# Patient Record
Sex: FEMALE | Race: WHITE | NOT HISPANIC OR LATINO | Employment: PART TIME | URBAN - METROPOLITAN AREA
[De-identification: names, ages, dates, MRNs, and addresses within clinical notes are randomized per-mention and may not be internally consistent; named-entity substitution may affect disease eponyms.]

---

## 2021-08-03 ENCOUNTER — TELEPHONE (OUTPATIENT)
Dept: OBGYN CLINIC | Facility: CLINIC | Age: 41
End: 2021-08-03

## 2021-08-03 DIAGNOSIS — Z30.41 SURVEILLANCE OF CONTRACEPTIVE PILL: Primary | ICD-10-CM

## 2021-08-03 RX ORDER — LEVONORGESTREL AND ETHINYL ESTRADIOL 0.15-0.03
1 KIT ORAL DAILY
Qty: 28 TABLET | Refills: 1 | Status: SHIPPED | OUTPATIENT
Start: 2021-08-03 | End: 2021-10-28 | Stop reason: SDUPTHER

## 2021-08-03 NOTE — TELEPHONE ENCOUNTER
[facility] called requesting OCP refill to start new pack on Sunday  Prior  [facility] 6/2020 with jahaira Elias scheduled for 9/23/2021  OCP Jonas 1 15-0 03  Confirmed [facility] pharmacy on [facility]  [place]  Does not need call back unless unable to provide refill  Dr Miriam Henderson, please address refill until [facility]

## 2021-09-21 ENCOUNTER — VBI (OUTPATIENT)
Dept: ADMINISTRATIVE | Facility: OTHER | Age: 41
End: 2021-09-21

## 2021-09-21 NOTE — TELEPHONE ENCOUNTER
Upon review of the In Basket request we were able to locate, review, and update the patient chart as requested for Pap Smear (HPV) aka Cervical Cancer Screening  Any additional questions or concerns should be emailed to the Practice Liaisons via [email or web address] email, please do not reply via In Basket      Thank you  Edie Cowan

## 2021-10-27 RX ORDER — VILAZODONE HYDROCHLORIDE 40 MG/1
40 TABLET ORAL
COMMUNITY

## 2021-10-28 ENCOUNTER — ANNUAL EXAM (OUTPATIENT)
Dept: OBGYN CLINIC | Facility: CLINIC | Age: 41
End: 2021-10-28
Payer: COMMERCIAL

## 2021-10-28 VITALS
DIASTOLIC BLOOD PRESSURE: 88 MMHG | BODY MASS INDEX: 33.34 KG/M2 | HEIGHT: 67 IN | SYSTOLIC BLOOD PRESSURE: 140 MMHG | WEIGHT: 212.4 LBS

## 2021-10-28 DIAGNOSIS — Z80.3 FAMILY HISTORY OF BREAST CANCER: ICD-10-CM

## 2021-10-28 DIAGNOSIS — Z12.31 ENCOUNTER FOR SCREENING MAMMOGRAM FOR MALIGNANT NEOPLASM OF BREAST: ICD-10-CM

## 2021-10-28 DIAGNOSIS — Z30.41 SURVEILLANCE OF CONTRACEPTIVE PILL: ICD-10-CM

## 2021-10-28 DIAGNOSIS — Z01.419 ENCOUNTER FOR GYNECOLOGICAL EXAMINATION WITHOUT ABNORMAL FINDING: Primary | ICD-10-CM

## 2021-10-28 DIAGNOSIS — F32.A DEPRESSION, UNSPECIFIED DEPRESSION TYPE: ICD-10-CM

## 2021-10-28 DIAGNOSIS — Z30.09 CONTRACEPTIVE EDUCATION: ICD-10-CM

## 2021-10-28 PROCEDURE — 99396 PREV VISIT EST AGE 40-64: CPT | Performed by: OBSTETRICS & GYNECOLOGY

## 2021-10-28 RX ORDER — LATANOPROST 50 UG/ML
SOLUTION/ DROPS OPHTHALMIC
COMMUNITY
Start: 2021-10-12

## 2021-10-28 RX ORDER — LEVONORGESTREL AND ETHINYL ESTRADIOL 0.15-0.03
1 KIT ORAL DAILY
Qty: 90 TABLET | Refills: 3 | Status: SHIPPED | OUTPATIENT
Start: 2021-10-28 | End: 2022-03-25 | Stop reason: SDUPTHER

## 2021-10-28 RX ORDER — PROPRANOLOL HYDROCHLORIDE 20 MG/1
TABLET ORAL
COMMUNITY
Start: 2021-10-01

## 2021-11-11 ENCOUNTER — OFFICE VISIT (OUTPATIENT)
Dept: GASTROENTEROLOGY | Facility: CLINIC | Age: 41
End: 2021-11-11
Payer: COMMERCIAL

## 2021-11-11 VITALS
HEART RATE: 87 BPM | SYSTOLIC BLOOD PRESSURE: 140 MMHG | WEIGHT: 214 LBS | HEIGHT: 67 IN | DIASTOLIC BLOOD PRESSURE: 84 MMHG | BODY MASS INDEX: 33.59 KG/M2

## 2021-11-11 DIAGNOSIS — K21.9 GASTROESOPHAGEAL REFLUX DISEASE WITHOUT ESOPHAGITIS: Primary | ICD-10-CM

## 2021-11-11 DIAGNOSIS — Z80.0 FAMILY HISTORY OF COLON CANCER: Primary | ICD-10-CM

## 2021-11-11 DIAGNOSIS — Z80.0 FAMILY HISTORY OF COLON CANCER: ICD-10-CM

## 2021-11-11 DIAGNOSIS — Z83.71 FAMILY HISTORY OF COLONIC POLYPS: ICD-10-CM

## 2021-11-11 PROCEDURE — 99214 OFFICE O/P EST MOD 30 MIN: CPT | Performed by: INTERNAL MEDICINE

## 2021-11-11 RX ORDER — SODIUM PICOSULFATE, MAGNESIUM OXIDE, AND ANHYDROUS CITRIC ACID 10; 3.5; 12 MG/160ML; G/160ML; G/160ML
LIQUID ORAL
Qty: 320 ML | Refills: 0 | Status: SHIPPED | OUTPATIENT
Start: 2021-11-11 | End: 2021-12-17 | Stop reason: HOSPADM

## 2021-11-11 RX ORDER — LEVONORGESTREL AND ETHINYL ESTRADIOL 0.15-0.03
1 KIT ORAL DAILY
COMMUNITY
End: 2022-04-21 | Stop reason: SDUPTHER

## 2021-11-11 RX ORDER — PANTOPRAZOLE SODIUM 40 MG/1
40 TABLET, DELAYED RELEASE ORAL DAILY
Qty: 30 TABLET | Refills: 2 | Status: SHIPPED | OUTPATIENT
Start: 2021-11-11 | End: 2022-03-13

## 2021-12-10 ENCOUNTER — TELEPHONE (OUTPATIENT)
Dept: GASTROENTEROLOGY | Facility: CLINIC | Age: 41
End: 2021-12-10

## 2021-12-17 ENCOUNTER — ANESTHESIA (OUTPATIENT)
Dept: GASTROENTEROLOGY | Facility: AMBULATORY SURGERY CENTER | Age: 41
End: 2021-12-17

## 2021-12-17 ENCOUNTER — ANESTHESIA EVENT (OUTPATIENT)
Dept: GASTROENTEROLOGY | Facility: AMBULATORY SURGERY CENTER | Age: 41
End: 2021-12-17

## 2021-12-17 ENCOUNTER — HOSPITAL ENCOUNTER (OUTPATIENT)
Dept: GASTROENTEROLOGY | Facility: AMBULATORY SURGERY CENTER | Age: 41
Discharge: HOME | End: 2021-12-17
Payer: COMMERCIAL

## 2021-12-17 VITALS
DIASTOLIC BLOOD PRESSURE: 71 MMHG | SYSTOLIC BLOOD PRESSURE: 112 MMHG | OXYGEN SATURATION: 96 % | RESPIRATION RATE: 24 BRPM | HEART RATE: 60 BPM | HEIGHT: 68 IN | TEMPERATURE: 98.6 F | BODY MASS INDEX: 28.04 KG/M2 | WEIGHT: 185 LBS

## 2021-12-17 DIAGNOSIS — Z83.71 FAMILY HISTORY OF COLONIC POLYPS: ICD-10-CM

## 2021-12-17 DIAGNOSIS — K21.9 GASTROESOPHAGEAL REFLUX DISEASE WITHOUT ESOPHAGITIS: ICD-10-CM

## 2021-12-17 DIAGNOSIS — Z80.0 FAMILY HISTORY OF COLON CANCER: ICD-10-CM

## 2021-12-17 PROBLEM — H40.9 GLAUCOMA: Status: ACTIVE | Noted: 2021-12-17

## 2021-12-17 LAB
EXT PREGNANCY TEST URINE: NEGATIVE
EXT. CONTROL: NORMAL

## 2021-12-17 PROCEDURE — 45380 COLONOSCOPY AND BIOPSY: CPT | Performed by: INTERNAL MEDICINE

## 2021-12-17 PROCEDURE — 43239 EGD BIOPSY SINGLE/MULTIPLE: CPT | Performed by: INTERNAL MEDICINE

## 2021-12-17 PROCEDURE — 88305 TISSUE EXAM BY PATHOLOGIST: CPT | Performed by: SPECIALIST

## 2021-12-17 RX ORDER — LIDOCAINE HYDROCHLORIDE 10 MG/ML
INJECTION, SOLUTION EPIDURAL; INFILTRATION; INTRACAUDAL; PERINEURAL AS NEEDED
Status: DISCONTINUED | OUTPATIENT
Start: 2021-12-17 | End: 2021-12-17

## 2021-12-17 RX ORDER — PROPOFOL 10 MG/ML
INJECTION, EMULSION INTRAVENOUS AS NEEDED
Status: DISCONTINUED | OUTPATIENT
Start: 2021-12-17 | End: 2021-12-17

## 2021-12-17 RX ORDER — SODIUM CHLORIDE, SODIUM LACTATE, POTASSIUM CHLORIDE, CALCIUM CHLORIDE 600; 310; 30; 20 MG/100ML; MG/100ML; MG/100ML; MG/100ML
50 INJECTION, SOLUTION INTRAVENOUS CONTINUOUS
Status: DISCONTINUED | OUTPATIENT
Start: 2021-12-17 | End: 2021-12-21 | Stop reason: HOSPADM

## 2021-12-17 RX ADMIN — PROPOFOL 150 MG: 10 INJECTION, EMULSION INTRAVENOUS at 13:42

## 2021-12-17 RX ADMIN — PROPOFOL 50 MG: 10 INJECTION, EMULSION INTRAVENOUS at 13:47

## 2021-12-17 RX ADMIN — PROPOFOL 50 MG: 10 INJECTION, EMULSION INTRAVENOUS at 13:44

## 2021-12-17 RX ADMIN — PROPOFOL 50 MG: 10 INJECTION, EMULSION INTRAVENOUS at 13:51

## 2021-12-17 RX ADMIN — SODIUM CHLORIDE, SODIUM LACTATE, POTASSIUM CHLORIDE, CALCIUM CHLORIDE 50 ML/HR: 600; 310; 30; 20 INJECTION, SOLUTION INTRAVENOUS at 13:23

## 2021-12-17 RX ADMIN — PROPOFOL 50 MG: 10 INJECTION, EMULSION INTRAVENOUS at 13:57

## 2021-12-17 RX ADMIN — PROPOFOL 50 MG: 10 INJECTION, EMULSION INTRAVENOUS at 14:09

## 2021-12-17 RX ADMIN — PROPOFOL 50 MG: 10 INJECTION, EMULSION INTRAVENOUS at 14:03

## 2021-12-17 RX ADMIN — LIDOCAINE HYDROCHLORIDE 50 MG: 10 INJECTION, SOLUTION EPIDURAL; INFILTRATION; INTRACAUDAL; PERINEURAL at 13:42

## 2022-03-10 ENCOUNTER — HOSPITAL ENCOUNTER (OUTPATIENT)
Dept: MAMMOGRAPHY | Facility: CLINIC | Age: 42
Discharge: HOME | End: 2022-03-10
Payer: COMMERCIAL

## 2022-03-10 VITALS — HEIGHT: 68 IN | BODY MASS INDEX: 28.04 KG/M2 | WEIGHT: 185 LBS

## 2022-03-10 DIAGNOSIS — Z80.3 FAMILY HISTORY OF BREAST CANCER: ICD-10-CM

## 2022-03-10 DIAGNOSIS — Z12.31 ENCOUNTER FOR SCREENING MAMMOGRAM FOR MALIGNANT NEOPLASM OF BREAST: ICD-10-CM

## 2022-03-10 PROCEDURE — 77067 SCR MAMMO BI INCL CAD: CPT

## 2022-03-10 PROCEDURE — 77063 BREAST TOMOSYNTHESIS BI: CPT

## 2022-03-13 DIAGNOSIS — K21.9 GASTROESOPHAGEAL REFLUX DISEASE WITHOUT ESOPHAGITIS: ICD-10-CM

## 2022-03-13 RX ORDER — PANTOPRAZOLE SODIUM 40 MG/1
TABLET, DELAYED RELEASE ORAL
Qty: 30 TABLET | Refills: 2 | Status: SHIPPED | OUTPATIENT
Start: 2022-03-13 | End: 2022-06-25

## 2022-03-25 ENCOUNTER — AMB VIDEO VISIT (OUTPATIENT)
Dept: OTHER | Facility: HOSPITAL | Age: 42
End: 2022-03-25

## 2022-03-25 DIAGNOSIS — J01.40 ACUTE NON-RECURRENT PANSINUSITIS: Primary | ICD-10-CM

## 2022-03-25 PROBLEM — F50.029 ANOREXIA NERVOSA, BINGE-EATING PURGING TYPE: Status: ACTIVE | Noted: 2022-03-25

## 2022-03-25 PROBLEM — F50.02 ANOREXIA NERVOSA, BINGE-EATING PURGING TYPE: Status: ACTIVE | Noted: 2022-03-25

## 2022-03-25 PROCEDURE — ECARE PR SL URGENT CARE VIRTUAL VISIT: Performed by: PHYSICIAN ASSISTANT

## 2022-03-25 RX ORDER — AMOXICILLIN AND CLAVULANATE POTASSIUM 875; 125 MG/1; MG/1
1 TABLET, FILM COATED ORAL 2 TIMES DAILY
Qty: 20 TABLET | Refills: 0 | Status: SHIPPED | OUTPATIENT
Start: 2022-03-25 | End: 2022-04-04

## 2022-03-25 NOTE — PATIENT INSTRUCTIONS
As discussed, sinus infections are typically viral and will get better on their own in 7-10 days  You should try symptomatic relief in the meantime with OTC allergy meds (Claritin or Zyrtec)  You can also try sinus rinses with a neti pot or nasal lavage (be sure to use distilled water ) If your symptoms do not improve after 7-10 days, you can take antibiotics because it is more likely to be bacterial at that point  Follow-up with your primary care physician for recheck in 2-3 business days  Go to the ER for sudden severe headache, high fevers, change in vision, seizure activity or anything else that is concerning

## 2022-03-25 NOTE — PROGRESS NOTES
Video Visit - [facility] Wilman 40 y o  female MRN: [id number]    REQUIRED DOCUMENTATION:         1  [facility]  2  Provider located at [address] [place] [phone]  3  [facility] provider: Pedrito Oviedo PA-C   4  Identify all parties in room with patient during [facility] visit:  No one else  5  After connecting through [facility]), patient was identified by name and date of birth  Patient was then informed that this was a Telemedicine visit and that the exam was being conducted confidentially over secure lines  My office door was closed  No one else was in the room  Patient acknowledged consent and understanding of privacy and security of the Telemedicine visit  I informed the patient that I have reviewed their record in Epic and presented the opportunity for them to ask any questions regarding the visit today  The patient agreed to participate  VITALS: Heart Rate: 78 BPM, Respiratory Rate: 16 RPM, Temperature 100 3° F, Blood Pressure Unavailable mmHg, Pulse Ox Unavailable % on RA    HPI  Pt reports facial pressure, sneezing, runny stuffy nose, cough, fevers, sore throat since Tuesday  Sx worsening  Taking theraflu with some relief of sx, but due to glaucoma, can't take a lot of meds  No known sick contacts, at home COVID tests negative x 2  Vaccinated for COVID x 3  Not a smoker    Physical Exam  Constitutional:       General: She is in acute distress (mild)  Appearance: Normal appearance  She is ill-appearing  She is not toxic-appearing  HENT:      Head: Normocephalic and atraumatic  Nose: Rhinorrhea (frequently sniffling and wiping nose) present  Comments: Sounds congested     Mouth/Throat:      Mouth: Mucous membranes are moist    Eyes:      Conjunctiva/sclera: Conjunctivae normal    Pulmonary:      Effort: Pulmonary effort is normal  No respiratory distress  Breath sounds:  No wheezing (no gross audible wheeze through computer)  Musculoskeletal:      Cervical back: Normal range of motion  Skin:     Findings: No rash (on face or neck)  Neurological:      Mental Status: She is alert  Cranial Nerves: No dysarthria or facial asymmetry  Psychiatric:         Mood and Affect: Mood normal          Behavior: Behavior normal          Diagnoses and all orders for this visit:    Acute non-recurrent pansinusitis  -     amoxicillin-clavulanate (AUGMENTIN) 875-125 mg per tablet; Take 1 tablet by mouth 2 (two) times a day for 10 days      Patient Instructions   As discussed, sinus infections are typically viral and will get better on their own in 7-10 days  You should try symptomatic relief in the meantime with OTC allergy meds (Claritin or Zyrtec)  You can also try sinus rinses with a neti pot or nasal lavage (be sure to use distilled water ) If your symptoms do not improve after 7-10 days, you can take antibiotics because it is more likely to be bacterial at that point  Follow-up with your primary care physician for recheck in 2-3 business days  Go to the ER for sudden severe headache, high fevers, change in vision, seizure activity or anything else that is concerning

## 2022-03-25 NOTE — CARE ANYWHERE EVISITS
Visit Summary for [facility] - Gender: Female - Date of Birth: [id number]  Date: [id number] - Duration: 9 minutes  Patient: [address]  Provider: Jc Carrillo PA-C    Patient Contact Information  Address  [address]; [place]  [phone]    Visit Topics    Triage Questions   What is your current physical address in the event of a medical emergency? Answer []  Are you allergic to any medications? Answer []  Are you now or could you be pregnant? Answer []  Do you have any immune system compromise or chronic lung   disease? Answer []  Do you have any vulnerable family members in the home (infant, pregnant, cancer, elderly)? Answer []     Conversation Transcripts  [0A][0A] [Notification] You are connected with Jc Carrillo PA-C, Urgent Care Specialist [0A][Notification] [facility] is located in [place]  [0A][Notification] [facility] has shared health history  Jose Enrique Pena  [0A]    Diagnosis  Acute pansinusitis    Procedures  Value: [id number] Code: CPT-4 UNLISTED E&M SERVICE    Medications Prescribed    No prescriptions ordered    Electronically signed by: Marycruz Sesay(NPI [id number])

## 2022-04-20 ENCOUNTER — TELEPHONE (OUTPATIENT)
Dept: OBGYN CLINIC | Facility: CLINIC | Age: 42
End: 2022-04-20

## 2022-04-20 DIAGNOSIS — Z30.41 SURVEILLANCE OF CONTRACEPTIVE PILL: Primary | ICD-10-CM

## 2022-04-20 NOTE — TELEPHONE ENCOUNTER
Pt called to inquire why her birth control (Seasonale) script had been canceled  Pt had a WA on 1028/22  Upon review of chart, pt had a Tele med visit with [facility] on 3/25/22, prescription was inadvertently cancelled at her Tele-med visit  Please resend a script for her birth control  Pt is aware she is due for [facility] after 10/28/22

## 2022-04-21 RX ORDER — LEVONORGESTREL AND ETHINYL ESTRADIOL 0.15-0.03
1 KIT ORAL DAILY
Qty: 90 TABLET | Refills: 0 | Status: SHIPPED | OUTPATIENT
Start: 2022-04-21 | End: 2022-07-18

## 2022-06-25 DIAGNOSIS — K21.9 GASTROESOPHAGEAL REFLUX DISEASE WITHOUT ESOPHAGITIS: ICD-10-CM

## 2022-06-25 RX ORDER — PANTOPRAZOLE SODIUM 40 MG/1
TABLET, DELAYED RELEASE ORAL
Qty: 30 TABLET | Refills: 2 | Status: SHIPPED | OUTPATIENT
Start: 2022-06-25

## 2022-09-27 DIAGNOSIS — K21.9 GASTROESOPHAGEAL REFLUX DISEASE WITHOUT ESOPHAGITIS: ICD-10-CM

## 2022-09-27 RX ORDER — PANTOPRAZOLE SODIUM 40 MG/1
TABLET, DELAYED RELEASE ORAL
Qty: 30 TABLET | Refills: 2 | Status: SHIPPED | OUTPATIENT
Start: 2022-09-27

## 2022-11-09 NOTE — PROGRESS NOTES
Assessment/Plan:    Contraceptive education  39 yo G1 on continuous OCPs and Lamictal who would like to d/c OCPs due to psychiatrist discussion that meds may be more effective off hormones and concern for decreased pill efficacy with Lamictal   R&B of vasectomy, tubal ligation and IUDs reviewed  Interested in progesterone IUD after thorough discussion and question review  Will verify coverage, continue OCPs (continous) until insertion and RTO for placement  Pre-insertion recommendations reviewed  Diagnoses and all orders for this visit:    Contraceptive education          Subjective:      Patient ID: Rose Mary Aleman is a 40 y o  female  HPI Last well check 11/10/22    The following portions of the patient's history were reviewed and updated as appropriate:   She  has a past medical history of Anxiety, Bulimia (2020), Depression, Eating disorder, GERD (gastroesophageal reflux disease), Glaucoma, Migraines, Papanicolaou smear for cervical cancer screening (2019), and Tremors of nervous system  She   Patient Active Problem List    Diagnosis Date Noted   • Contraceptive education 11/11/2022   • Gynecologic exam normal 11/10/2022   • Anorexia nervosa, binge-eating purging type 03/25/2022   • Gastroesophageal reflux disease 12/17/2021   • Glaucoma 12/17/2021     She  has a past surgical history that includes Tonsillectomy; Sinus surgery; Upper gastrointestinal endoscopy; Colonoscopy; and TONSILECTOMY AND ADNOIDECTOMY  Her family history includes Alzheimer's disease in her maternal grandmother; Breast cancer (age of onset: 52) in her maternal aunt; Colon cancer in her paternal grandmother; Colon polyps in her brother and father; Heart attack in her maternal grandfather; Hyperlipidemia in her mother; Hypertension in her father and maternal grandfather; Lymphoma in her father; Rectal cancer (age of onset: 43) in her brother; Skin cancer in her maternal grandmother; Stroke in her paternal aunt    She  reports that she has never smoked  She has never used smokeless tobacco  She reports previous alcohol use  She reports that she does not use drugs  Current Outpatient Medications   Medication Sig Dispense Refill   • fluconazole (DIFLUCAN) 150 mg tablet Take 1 tablet (150 mg total) by mouth every other day for 2 doses 2 tablet 0   • lamoTRIgine (LaMICtal) 200 MG tablet Take 400 mg by mouth daily     • latanoprost (XALATAN) 0 005 % ophthalmic solution place 1 drop into both eyes at bedtime     • levonorgestrel-ethinyl estradiol (Setlakin) 0 15-0 03 MG per tablet Take 1 tablet by mouth daily 90 tablet 3   • lithium carbonate 150 mg capsule Take 150 mg by mouth 2 (two) times a day     • pantoprazole (PROTONIX) 40 mg tablet take 1 tablet by mouth once daily 30 tablet 2   • propranolol (INDERAL) 20 mg tablet take 1 tablet by mouth once daily if needed for ACUTE ANXIETY     • vilazodone (VIIBRYD) 40 mg tablet Take 40 mg by mouth daily with breakfast       No current facility-administered medications for this visit  She is allergic to latex       Review of Systems  no menses    Objective:      /70 (BP Location: Left arm, Patient Position: Sitting, Cuff Size: Large)   Ht 5' 6 5" (1 689 m)   Wt 97 2 kg (214 lb 4 8 oz)   Breastfeeding No   BMI 34 07 kg/m²          Physical Exam    Appears well, no apparent distress  Does not appear anxious or depressed

## 2022-11-10 ENCOUNTER — ANNUAL EXAM (OUTPATIENT)
Dept: OBGYN CLINIC | Facility: CLINIC | Age: 42
End: 2022-11-10

## 2022-11-10 VITALS
BODY MASS INDEX: 33.62 KG/M2 | HEIGHT: 67 IN | WEIGHT: 214.2 LBS | DIASTOLIC BLOOD PRESSURE: 80 MMHG | SYSTOLIC BLOOD PRESSURE: 126 MMHG

## 2022-11-10 DIAGNOSIS — Z12.31 ENCOUNTER FOR SCREENING MAMMOGRAM FOR MALIGNANT NEOPLASM OF BREAST: ICD-10-CM

## 2022-11-10 DIAGNOSIS — B37.9 YEAST INFECTION: ICD-10-CM

## 2022-11-10 DIAGNOSIS — Z30.41 SURVEILLANCE OF CONTRACEPTIVE PILL: ICD-10-CM

## 2022-11-10 DIAGNOSIS — Z01.419 GYNECOLOGIC EXAM NORMAL: Primary | ICD-10-CM

## 2022-11-10 RX ORDER — LAMOTRIGINE 200 MG/1
400 TABLET ORAL DAILY
COMMUNITY
Start: 2022-09-15

## 2022-11-10 RX ORDER — LEVONORGESTREL AND ETHINYL ESTRADIOL 0.15-0.03
1 KIT ORAL DAILY
Qty: 90 TABLET | Refills: 3 | Status: SHIPPED | OUTPATIENT
Start: 2022-11-10 | End: 2023-05-09

## 2022-11-10 RX ORDER — LITHIUM CARBONATE 150 MG/1
150 CAPSULE ORAL 2 TIMES DAILY
COMMUNITY
Start: 2022-08-27

## 2022-11-10 RX ORDER — FLUCONAZOLE 150 MG/1
150 TABLET ORAL EVERY OTHER DAY
Qty: 2 TABLET | Refills: 0 | Status: SHIPPED | OUTPATIENT
Start: 2022-11-10 | End: 2022-11-13

## 2022-11-10 NOTE — ASSESSMENT & PLAN NOTE
Pap guidelines reviewed  Pap deferred secondary to negative pap in 2019 in this low risk patient  Continue yearly mammograms  Script given  Reviewed yeast infection on today's exam  Will plan to treat with Diflucan 150mg x 2 doses 2 days apart  For prevention: Recommend acidophilus or yogurt daily, avoid irritating soaps or lotions, no tight fitted pants or underwear, avoid bubble baths, do not stay in wet swimsuit    Has appt tomorrow with Dr Moreno Monsalve to review tubal ligation will refill birth control until has tubal

## 2022-11-10 NOTE — PROGRESS NOTES
Assessment/Plan   Problem List Items Addressed This Visit        Other    Gynecologic exam normal - Primary     Pap guidelines reviewed  Pap deferred secondary to negative pap in 2019 in this low risk patient  Continue yearly mammograms  Script given  Reviewed yeast infection on today's exam  Will plan to treat with Diflucan 150mg x 2 doses 2 days apart  For prevention: Recommend acidophilus or yogurt daily, avoid irritating soaps or lotions, no tight fitted pants or underwear, avoid bubble baths, do not stay in wet swimsuit  Has appt tomorrow with Dr Emma Carlos to review tubal ligation will refill birth control until has tubal               Other Visit Diagnoses     Encounter for screening mammogram for malignant neoplasm of breast        Relevant Orders    Mammo screening bilateral w 3d & cad    Surveillance of contraceptive pill        Relevant Medications    levonorgestrel-ethinyl estradiol (Setlakin) 0 15-0 03 MG per tablet    Yeast infection        Relevant Medications    fluconazole (DIFLUCAN) 150 mg tablet          Subjective:     Patient ID: Kath Alvarenga is a 40 y o  y o  female  HPI  39 yo seen for annual exam  Currently on Setlakin OCP, has consultation tomorrow with Dr Emma Carlos to discuss permanent sterilization as OCP is interacting with her medications (Lamictal) and she may not be getting full benefit  Denies bowel or bladder issues out of the ordinary  Reports vaginal discharge yellow-green in color and itching  Denies odor  Reports has issues with what she believes are recurrent BV infection  Has been using OTC "V Fresh" which sometimes helps  Last pap: 6/5/2019 NILM (-)HRHPV  Last mammogram: 3/10/2022 BIRADS 1: Negative  Last colonoscopy: 2021 recommend repeat in 2 years     The following portions of the patient's history were reviewed and updated as appropriate:   She  has a past medical history of Anxiety, Bulimia (2020), Depression, Eating disorder, GERD (gastroesophageal reflux disease), Glaucoma, Migraines, Papanicolaou smear for cervical cancer screening (2019), and Tremors of nervous system  She   Patient Active Problem List    Diagnosis Date Noted   • Gynecologic exam normal 11/10/2022   • Anorexia nervosa, binge-eating purging type 03/25/2022   • Gastroesophageal reflux disease 12/17/2021   • Glaucoma 12/17/2021     She  has a past surgical history that includes Tonsillectomy; Sinus surgery; Upper gastrointestinal endoscopy; Colonoscopy; and TONSILECTOMY AND ADNOIDECTOMY  Her family history includes Alzheimer's disease in her maternal grandmother; Breast cancer (age of onset: 52) in her maternal aunt; Colon cancer in her paternal grandmother; Colon polyps in her brother and father; Heart attack in her maternal grandfather; Hyperlipidemia in her mother; Hypertension in her father and maternal grandfather; Lymphoma in her father; Rectal cancer (age of onset: 43) in her brother; Skin cancer in her maternal grandmother; Stroke in her paternal aunt  She  reports that she has never smoked  She has never used smokeless tobacco  She reports previous alcohol use  She reports that she does not use drugs    Current Outpatient Medications   Medication Sig Dispense Refill   • fluconazole (DIFLUCAN) 150 mg tablet Take 1 tablet (150 mg total) by mouth every other day for 2 doses 2 tablet 0   • lamoTRIgine (LaMICtal) 200 MG tablet Take 400 mg by mouth daily     • latanoprost (XALATAN) 0 005 % ophthalmic solution place 1 drop into both eyes at bedtime     • levonorgestrel-ethinyl estradiol (Setlakin) 0 15-0 03 MG per tablet Take 1 tablet by mouth daily 90 tablet 3   • lithium carbonate 150 mg capsule Take 150 mg by mouth 2 (two) times a day     • pantoprazole (PROTONIX) 40 mg tablet take 1 tablet by mouth once daily 30 tablet 2   • propranolol (INDERAL) 20 mg tablet take 1 tablet by mouth once daily if needed for ACUTE ANXIETY     • vilazodone (VIIBRYD) 40 mg tablet Take 40 mg by mouth daily with breakfast       No current facility-administered medications for this visit  She is allergic to latex       Menstrual History:  OB History        0    Para   0    Term   0       0    AB   0    Living   0       SAB   0    IAB   0    Ectopic   0    Multiple   0    Live Births   0           Obstetric Comments   Menarche: 13              No LMP recorded  (Menstrual status: Birth Control)  Review of Systems   Constitutional: Negative for fatigue, fever and unexpected weight change  HENT: Negative for dental problem and sinus pressure  Eyes: Negative for visual disturbance  Respiratory: Negative for cough, shortness of breath and wheezing  Cardiovascular: Negative for chest pain  Gastrointestinal: Negative for abdominal pain, blood in stool, constipation, diarrhea, nausea and vomiting  Endocrine: Negative for polydipsia  Genitourinary: Negative for difficulty urinating, dyspareunia, dysuria, frequency, hematuria, pelvic pain and urgency  Musculoskeletal: Negative for arthralgias and back pain  Neurological: Negative for dizziness, seizures, light-headedness and headaches  Psychiatric/Behavioral: Negative for suicidal ideas  The patient is not nervous/anxious  Objective:  Vitals:    11/10/22 1308   BP: 126/80   BP Location: Left arm   Patient Position: Sitting   Cuff Size: Large   Weight: 97 2 kg (214 lb 3 2 oz)   Height: 5' 6 5" (1 689 m)      Physical Exam  Constitutional:       Appearance: Normal appearance  She is well-developed  Genitourinary:      Vulva and bladder normal       No lesions in the vagina  Right Labia: No rash, tenderness, lesions or skin changes  Left Labia: No tenderness, lesions, skin changes or rash  No labial fusion noted  No inguinal adenopathy present in the right or left side  Vaginal discharge (copious amount of thick yellow curd like discharge) present  No vaginal erythema, tenderness or bleeding          Right Adnexa: not tender, not full and no mass present  Left Adnexa: not tender, not full and no mass present  No cervical motion tenderness, discharge or lesion  Uterus is not enlarged, tender or irregular  No uterine mass detected  No urethral prolapse, tenderness or mass present  Bladder is not tender  Breasts: Breasts are symmetrical       Right: No swelling, bleeding, inverted nipple, mass, nipple discharge, skin change, tenderness, axillary adenopathy or supraclavicular adenopathy  Left: No swelling, bleeding, inverted nipple, mass, nipple discharge, skin change, tenderness, axillary adenopathy or supraclavicular adenopathy  HENT:      Head: Normocephalic and atraumatic  Neck:      Thyroid: No thyromegaly  Cardiovascular:      Rate and Rhythm: Normal rate and regular rhythm  Heart sounds: Normal heart sounds  No murmur heard  No friction rub  No gallop  Pulmonary:      Effort: Pulmonary effort is normal  No respiratory distress  Breath sounds: Normal breath sounds  No wheezing  Abdominal:      General: There is no distension  Palpations: Abdomen is soft  There is no mass  Tenderness: There is no abdominal tenderness  There is no guarding or rebound  Hernia: No hernia is present  Lymphadenopathy:      Cervical: No cervical adenopathy  Upper Body:      Right upper body: No supraclavicular, axillary or pectoral adenopathy  Left upper body: No supraclavicular, axillary or pectoral adenopathy  Lower Body: No right inguinal adenopathy  No left inguinal adenopathy  Neurological:      Mental Status: She is alert and oriented to person, place, and time  Skin:     General: Skin is warm and dry     Psychiatric:         Behavior: Behavior normal

## 2022-11-11 ENCOUNTER — CONSULT (OUTPATIENT)
Dept: OBGYN CLINIC | Facility: CLINIC | Age: 42
End: 2022-11-11

## 2022-11-11 VITALS
SYSTOLIC BLOOD PRESSURE: 110 MMHG | BODY MASS INDEX: 33.63 KG/M2 | HEIGHT: 67 IN | WEIGHT: 214.3 LBS | DIASTOLIC BLOOD PRESSURE: 70 MMHG

## 2022-11-11 DIAGNOSIS — Z30.09 CONTRACEPTIVE EDUCATION: Primary | ICD-10-CM

## 2022-11-11 NOTE — ASSESSMENT & PLAN NOTE
39 yo G1 on continuous OCPs and Lamictal who would like to d/c OCPs due to psychiatrist discussion that meds may be more effective off hormones and concern for decreased pill efficacy with Lamictal   R&B of vasectomy, tubal ligation and IUDs reviewed  Interested in progesterone IUD after thorough discussion and question review  Will verify coverage, continue OCPs (continous) until insertion and RTO for placement  Pre-insertion recommendations reviewed

## 2022-12-27 ENCOUNTER — TELEPHONE (OUTPATIENT)
Dept: OBGYN CLINIC | Facility: CLINIC | Age: 42
End: 2022-12-27

## 2022-12-27 NOTE — TELEPHONE ENCOUNTER
HungBaldwin has an IUD insertion appt on Thursday 12/29/22  She is currently taking OCPs  She is asking if should still continue taking OCP until IUD appt    Informed Susan may continue taking OCP , may stop after IUD insertion  Reminded her about taking Ibuprofen 600 mg one hour prior to IUD appt  Williams Simpson

## 2022-12-29 ENCOUNTER — PROCEDURE VISIT (OUTPATIENT)
Dept: OBGYN CLINIC | Facility: CLINIC | Age: 42
End: 2022-12-29

## 2022-12-29 VITALS
SYSTOLIC BLOOD PRESSURE: 128 MMHG | WEIGHT: 212.8 LBS | DIASTOLIC BLOOD PRESSURE: 84 MMHG | BODY MASS INDEX: 33.4 KG/M2 | HEIGHT: 67 IN

## 2022-12-29 DIAGNOSIS — Z30.430 ENCOUNTER FOR INSERTION OF MIRENA IUD: Primary | ICD-10-CM

## 2022-12-29 LAB — SL AMB POCT URINE HCG: NEGATIVE

## 2022-12-29 RX ORDER — NEOMYCIN SULFATE, POLYMYXIN B SULFATE AND DEXAMETHASONE 3.5; 10000; 1 MG/ML; [USP'U]/ML; MG/ML
SUSPENSION/ DROPS OPHTHALMIC
COMMUNITY
Start: 2022-12-27

## 2022-12-29 NOTE — ASSESSMENT & PLAN NOTE
Reviewed risks of insertion including perforation, migration that can lead to scarring, surgery and issues with infertility  Reviewed expulsion risk, risk of ectopic pregnancy as well as pelvic inflammatory disease  Reviewed common bleeding patterns and side effects  IUD inserted without difficulty  Patient reported pelvic pain 4-5/10, nausea, sweating and tingling cheeks after insertion  Allowed to rest for 30 minutes  Ultrasound done confirming placement in uterine cavity  Patient reports pain improving and feeling better  Had  pick her up  No intercourse, tampon use, soaking in bath tub, or swimming for the next 2-3 days to avoid risk of infection  Call office with excessive bleeding, cramping, fever, or chills  Return to office in 4 weeks for IUD check

## 2022-12-29 NOTE — PROGRESS NOTES
Assessment/Plan:    Encounter for insertion of mirena IUD  Reviewed risks of insertion including perforation, migration that can lead to scarring, surgery and issues with infertility  Reviewed expulsion risk, risk of ectopic pregnancy as well as pelvic inflammatory disease  Reviewed common bleeding patterns and side effects  IUD inserted without difficulty  Patient reported pelvic pain 4-5/10, nausea, sweating and tingling cheeks after insertion  Allowed to rest for 30 minutes  Ultrasound done confirming placement in uterine cavity  Patient reports pain improving and feeling better  Had  pick her up  No intercourse, tampon use, soaking in bath tub, or swimming for the next 2-3 days to avoid risk of infection  Call office with excessive bleeding, cramping, fever, or chills  Return to office in 4 weeks for IUD check  Problem List Items Addressed This Visit        Other    Encounter for insertion of mirena IUD - Primary     Reviewed risks of insertion including perforation, migration that can lead to scarring, surgery and issues with infertility  Reviewed expulsion risk, risk of ectopic pregnancy as well as pelvic inflammatory disease  Reviewed common bleeding patterns and side effects  IUD inserted without difficulty  Patient reported pelvic pain 4-5/10, nausea, sweating and tingling cheeks after insertion  Allowed to rest for 30 minutes  Ultrasound done confirming placement in uterine cavity  Patient reports pain improving and feeling better  Had  pick her up  No intercourse, tampon use, soaking in bath tub, or swimming for the next 2-3 days to avoid risk of infection  Call office with excessive bleeding, cramping, fever, or chills  Return to office in 4 weeks for IUD check              Relevant Medications    levonorgestrel (MIRENA) IUD 20 mcg/day (Completed)    Other Relevant Orders    POCT urine HCG (Completed)    Iud insertions         Subjective:      Patient ID: [facility] Wilman is a 40 y o  female  HPI  39 yo seen for Mirena IUD insertion  The following portions of the patient's history were reviewed and updated as appropriate: She  has a past medical history of Anxiety, Bulimia (2020), Depression, Eating disorder, GERD (gastroesophageal reflux disease), Glaucoma, Migraines, Papanicolaou smear for cervical cancer screening (2019), and Tremors of nervous system  She   Patient Active Problem List    Diagnosis Date Noted   • Encounter for insertion of mirena IUD 12/29/2022   • Contraceptive education 11/11/2022   • Gynecologic exam normal 11/10/2022   • Anorexia nervosa, binge-eating purging type 03/25/2022   • Gastroesophageal reflux disease 12/17/2021   • Glaucoma 12/17/2021     She  has a past surgical history that includes Tonsillectomy; Sinus surgery; Upper gastrointestinal endoscopy; Colonoscopy; and TONSILECTOMY AND ADNOIDECTOMY  Her family history includes Alzheimer's disease in her maternal grandmother; Breast cancer (age of onset: 52) in her maternal aunt; Colon cancer in her paternal grandmother; Colon polyps in her brother and father; Heart attack in her maternal grandfather; Hyperlipidemia in her mother; Hypertension in her father and maternal grandfather; Lymphoma in her father; Rectal cancer (age of onset: 43) in her brother; Skin cancer in her maternal grandmother; Stroke in her paternal aunt  She  reports that she has never smoked  She has never used smokeless tobacco  She reports that she does not currently use alcohol  She reports that she does not use drugs    Current Outpatient Medications   Medication Sig Dispense Refill   • lamoTRIgine (LaMICtal) 200 MG tablet Take 400 mg by mouth daily     • latanoprost (XALATAN) 0 005 % ophthalmic solution place 1 drop into both eyes at bedtime     • lithium carbonate 150 mg capsule Take 150 mg by mouth 2 (two) times a day     • neomycin-polymyxin-dexamethasone (MAXITROL) ophthalmic suspension instill 1 drop into right eye three times a day for 1 week     • pantoprazole (PROTONIX) 40 mg tablet take 1 tablet by mouth once daily 30 tablet 2   • propranolol (INDERAL) 20 mg tablet take 1 tablet by mouth once daily if needed for ACUTE ANXIETY     • vilazodone (VIIBRYD) 40 mg tablet Take 40 mg by mouth daily with breakfast       No current facility-administered medications for this visit  She is allergic to latex       Review of Systems   Constitutional: Negative for fatigue, fever and unexpected weight change  HENT: Negative for dental problem and sinus pressure  Eyes: Negative for visual disturbance  Respiratory: Negative for cough, shortness of breath and wheezing  Cardiovascular: Negative for chest pain  Gastrointestinal: Negative for abdominal pain, blood in stool, constipation, diarrhea, nausea and vomiting  Endocrine: Negative for polydipsia  Genitourinary: Negative for difficulty urinating, dyspareunia, dysuria, frequency, hematuria, pelvic pain and urgency  Musculoskeletal: Negative for arthralgias and back pain  Neurological: Negative for dizziness, seizures, light-headedness and headaches  Psychiatric/Behavioral: Negative for suicidal ideas  The patient is not nervous/anxious  Objective:      /84 (BP Location: Left arm, Patient Position: Sitting, Cuff Size: Standard)   Ht 5' 6 5" (1 689 m)   Wt 96 5 kg (212 lb 12 8 oz)   Breastfeeding No   BMI 33 83 kg/m²          Physical Exam  Constitutional:       Appearance: She is well-developed  Genitourinary:     Labia:         Right: No rash, tenderness, lesion or injury  Left: No rash, tenderness, lesion or injury  Vagina: No signs of injury and foreign body  No vaginal discharge, erythema, tenderness or bleeding  Cervix: No cervical motion tenderness, discharge or friability  Skin:     General: Skin is warm and dry  Coloration: Skin is not pale  Findings: No erythema or rash     Neurological:      Mental Status: She is alert and oriented to person, place, and time           Iud insertions    Date/Time: 12/29/2022 12:57 PM  Performed by: Jessica Goddard PA-C  Authorized by: Angelina Dubin, MD     Other Assisting Provider: No    Verbal consent obtained?: Yes    Risks and benefits: Risks, benefits and alternatives were discussed    Consent given by:  Patient  Time Out:     Time out: Immediately prior to the procedure a time out was called    Patient states understanding of procedure being performed: Yes    Patient's understanding of procedure matches consent: Yes    Procedure consent matches procedure scheduled: Yes    Relevant documents present and verified: Yes    Test results available and properly labeled: Yes    Required items: Required blood products, implants, devices and special equipment available    Patient identity confirmed:  Verbally with patient  Procedure:     Pelvic exam performed: yes      Negative urine pregnancy test: yes      Cervix cleaned and prepped: yes      Speculum placed in vagina: yes      Tenaculum applied to cervix: yes      Uterus sounded: yes      Uterus sound depth (cm):  7    IUD inserted with no complications: yes      IUD type:  Mirena    Strings trimmed: yes    Post-procedure:     Patient tolerated procedure well: yes      Patient will follow up after next period: yes

## 2023-01-09 PROBLEM — Z01.419 GYNECOLOGIC EXAM NORMAL: Status: RESOLVED | Noted: 2022-11-10 | Resolved: 2023-01-09

## 2023-01-27 ENCOUNTER — OFFICE VISIT (OUTPATIENT)
Dept: OBGYN CLINIC | Facility: CLINIC | Age: 43
End: 2023-01-27

## 2023-01-27 VITALS
HEIGHT: 67 IN | SYSTOLIC BLOOD PRESSURE: 120 MMHG | BODY MASS INDEX: 33.62 KG/M2 | WEIGHT: 214.2 LBS | DIASTOLIC BLOOD PRESSURE: 72 MMHG

## 2023-01-27 DIAGNOSIS — Z30.431 IUD CHECK UP: Primary | ICD-10-CM

## 2023-01-27 RX ORDER — LEVONORGESTREL 52 MG/1
1 INTRAUTERINE DEVICE INTRAUTERINE ONCE
COMMUNITY
Start: 2022-12-29

## 2023-01-27 NOTE — ASSESSMENT & PLAN NOTE
Doing well on Mirena IUD  Return to office for annual or as needed  Call office if having any issues

## 2023-01-27 NOTE — PROGRESS NOTES
Assessment/Plan:    IUD check up  Doing well on Mirena IUD  Return to office for annual or as needed  Call office if having any issues  Problem List Items Addressed This Visit        Other    IUD check up - Primary     Doing well on Mirena IUD  Return to office for annual or as needed  Call office if having any issues  Subjective:      Patient ID: Hollie Loja is a 44 y o  female  HPI  45 yo seen for IUD check  Had Mirena IUD inserted 12/29/2022  Reports had cramping for 1-2 days after procedure otherwise doing great  Feels like her psych medications are working much better off the pill can feel the difference  Has not had a menses yet  Denies any other symptoms or concerns regarding IUD  The following portions of the patient's history were reviewed and updated as appropriate: She  has a past medical history of Anxiety, Bulimia (2020), Depression, Eating disorder, GERD (gastroesophageal reflux disease), Glaucoma, Migraines, Papanicolaou smear for cervical cancer screening (2019), and Tremors of nervous system  She   Patient Active Problem List    Diagnosis Date Noted   • IUD check up 01/27/2023   • Encounter for insertion of mirena IUD 12/29/2022   • Contraceptive education 11/11/2022   • Anorexia nervosa, binge-eating purging type 03/25/2022   • Gastroesophageal reflux disease 12/17/2021   • Glaucoma 12/17/2021     She  has a past surgical history that includes Tonsillectomy; Sinus surgery; Upper gastrointestinal endoscopy; Colonoscopy; TONSILECTOMY AND ADNOIDECTOMY; and Mammo (historical) (Bilateral, 01/03/2019)  Her family history includes Alzheimer's disease in her maternal grandmother; Breast cancer (age of onset: 52) in her maternal aunt;  Colon cancer in her paternal grandmother; Colon polyps in her brother and father; Heart attack in her maternal grandfather; Hyperlipidemia in her mother; Hypertension in her father and maternal grandfather; Lymphoma in her father; Rectal cancer (age of onset: 43) in her brother; Skin cancer in her maternal grandmother; Stroke in her paternal aunt  She  reports that she has never smoked  She has never used smokeless tobacco  She reports that she does not currently use alcohol  She reports that she does not use drugs  Current Outpatient Medications   Medication Sig Dispense Refill   • lamoTRIgine (LaMICtal) 200 MG tablet Take 400 mg by mouth daily     • latanoprost (XALATAN) 0 005 % ophthalmic solution place 1 drop into both eyes at bedtime     • Levonorgestrel (Mirena, 52 MG,) 20 MCG/DAY IUD 1 each by Intrauterine route once     • lithium carbonate 150 mg capsule Take 150 mg by mouth 2 (two) times a day     • pantoprazole (PROTONIX) 40 mg tablet take 1 tablet by mouth once daily 30 tablet 0   • propranolol (INDERAL) 20 mg tablet take 1 tablet by mouth once daily if needed for ACUTE ANXIETY     • vilazodone (VIIBRYD) 40 mg tablet Take 40 mg by mouth daily with breakfast     • neomycin-polymyxin-dexamethasone (MAXITROL) ophthalmic suspension instill 1 drop into right eye three times a day for 1 week       No current facility-administered medications for this visit  She is allergic to latex       Review of Systems   Constitutional: Negative for fatigue, fever and unexpected weight change  HENT: Negative for dental problem and sinus pressure  Eyes: Negative for visual disturbance  Respiratory: Negative for cough, shortness of breath and wheezing  Cardiovascular: Negative for chest pain  Gastrointestinal: Negative for abdominal pain, blood in stool, constipation, diarrhea, nausea and vomiting  Endocrine: Negative for polydipsia  Genitourinary: Negative for difficulty urinating, dyspareunia, dysuria, frequency, hematuria, pelvic pain and urgency  Musculoskeletal: Negative for arthralgias and back pain  Neurological: Negative for dizziness, seizures, light-headedness and headaches     Psychiatric/Behavioral: Negative for suicidal ideas  The patient is not nervous/anxious  Objective:      /72 (BP Location: Right arm, Patient Position: Sitting, Cuff Size: Large)   Ht 5' 6 5" (1 689 m)   Wt 97 2 kg (214 lb 3 2 oz)   Breastfeeding No   BMI 34 05 kg/m²          Physical Exam  Constitutional:       Appearance: Normal appearance  She is well-developed  Neck:      Thyroid: No thyroid mass or thyromegaly  Cardiovascular:      Rate and Rhythm: Normal rate and regular rhythm  Heart sounds: Normal heart sounds  No murmur heard  No friction rub  No gallop  Pulmonary:      Effort: Pulmonary effort is normal  No respiratory distress  Breath sounds: Normal breath sounds  No wheezing or rales  Chest:      Chest wall: No tenderness  Abdominal:      General: Bowel sounds are normal  There is no distension  Palpations: Abdomen is soft  There is no mass  Tenderness: There is no abdominal tenderness  There is no guarding or rebound  Genitourinary:     Labia:         Right: No rash, tenderness, lesion or injury  Left: No rash, tenderness, lesion or injury  Urethra: No prolapse, urethral pain, urethral swelling or urethral lesion  Vagina: No signs of injury and foreign body  No vaginal discharge, erythema, tenderness or bleeding  Cervix: No cervical motion tenderness, discharge or friability  Uterus: Not deviated, not enlarged and not tender  Adnexa:         Right: No mass, tenderness or fullness  Left: No mass, tenderness or fullness  Comments: IUD strings visible  Musculoskeletal:      Cervical back: Neck supple  Lymphadenopathy:      Cervical: No cervical adenopathy  Upper Body:      Right upper body: No supraclavicular adenopathy  Left upper body: No supraclavicular adenopathy  Skin:     General: Skin is warm and dry  Coloration: Skin is not pale  Findings: No erythema or rash     Neurological:      Mental Status: She is alert and oriented to person, place, and time  Psychiatric:         Behavior: Behavior normal          Thought Content:  Thought content normal          Judgment: Judgment normal

## 2023-02-01 ENCOUNTER — TELEPHONE (OUTPATIENT)
Dept: GASTROENTEROLOGY | Facility: CLINIC | Age: 43
End: 2023-02-01

## 2023-02-01 DIAGNOSIS — K21.9 GASTROESOPHAGEAL REFLUX DISEASE WITHOUT ESOPHAGITIS: ICD-10-CM

## 2023-02-02 RX ORDER — PANTOPRAZOLE SODIUM 40 MG/1
TABLET, DELAYED RELEASE ORAL
Qty: 30 TABLET | Refills: 2 | Status: SHIPPED | OUTPATIENT
Start: 2023-02-02

## 2023-02-02 NOTE — TELEPHONE ENCOUNTER
GI Physician: Dr Salcedo Ahr for Medication: pantoprazole    Dose: 40mg    Quantity: 30    Pharmacy and Location: [facility] [place]    Pt scheduled 4/14/23

## 2023-05-25 ENCOUNTER — HOSPITAL ENCOUNTER (OUTPATIENT)
Dept: MAMMOGRAPHY | Facility: CLINIC | Age: 43
Discharge: HOME | End: 2023-05-25

## 2023-05-25 VITALS — WEIGHT: 214 LBS | BODY MASS INDEX: 33.59 KG/M2 | HEIGHT: 67 IN

## 2023-05-25 DIAGNOSIS — Z12.31 ENCOUNTER FOR SCREENING MAMMOGRAM FOR MALIGNANT NEOPLASM OF BREAST: ICD-10-CM

## 2023-07-19 DIAGNOSIS — K21.9 GASTROESOPHAGEAL REFLUX DISEASE WITHOUT ESOPHAGITIS: ICD-10-CM

## 2023-07-19 RX ORDER — PANTOPRAZOLE SODIUM 40 MG/1
TABLET, DELAYED RELEASE ORAL
Qty: 30 TABLET | Refills: 0 | Status: SHIPPED | OUTPATIENT
Start: 2023-07-19 | End: 2023-07-25 | Stop reason: SDUPTHER

## 2023-07-25 ENCOUNTER — OFFICE VISIT (OUTPATIENT)
Dept: GASTROENTEROLOGY | Facility: CLINIC | Age: 43
End: 2023-07-25
Payer: COMMERCIAL

## 2023-07-25 VITALS — WEIGHT: 224.4 LBS | HEIGHT: 68 IN | BODY MASS INDEX: 34.01 KG/M2

## 2023-07-25 DIAGNOSIS — K21.9 GASTROESOPHAGEAL REFLUX DISEASE WITHOUT ESOPHAGITIS: ICD-10-CM

## 2023-07-25 DIAGNOSIS — K58.0 IRRITABLE BOWEL SYNDROME WITH DIARRHEA: Primary | ICD-10-CM

## 2023-07-25 PROCEDURE — 99214 OFFICE O/P EST MOD 30 MIN: CPT | Performed by: INTERNAL MEDICINE

## 2023-07-25 RX ORDER — PANTOPRAZOLE SODIUM 40 MG/1
40 TABLET, DELAYED RELEASE ORAL DAILY
Qty: 30 TABLET | Refills: 0 | Status: SHIPPED | OUTPATIENT
Start: 2023-07-25

## 2023-07-25 RX ORDER — IBUPROFEN 200 MG
400 TABLET ORAL
COMMUNITY

## 2023-07-25 NOTE — PROGRESS NOTES
[facility] - Outpatient Follow-up Note  Rogelio Pollack 44 y.o. female MRN: [id number]  Encounter: [id number]      No results found. [facility] - Outpatient Follow-up Note  Rogelio Pollack 44 y.o. female MRN: [id number]  Encounter: [id number]    ASSESSMENT AND PLAN:      1. Gastroesophageal reflux disease without esophagitis  Chronic renew pantoprazole. - pantoprazole (PROTONIX) 40 mg tablet; Take 1 tablet (40 mg total) by mouth daily  Dispense: 30 tablet; Refill: 0  - Calprotectin,Fecal; Future  - Clostridium difficile toxin by PCR with EIA; Future  - CBC; Future  - Comprehensive metabolic panel; Future  - Celiac Disease Comprehensive Panel; Future  -    2. Irritable bowel syndrome with diarrhea  Symptoms rather classic but she has no previous history. Did have a high-quality negative colonoscopy 2 years ago. Will order stool studies including calprotectin and treat with hyoscyamine. Followup Appointment: 6 to 8 weeks  ______________________________________________________________________    Chief Complaint   Patient presents with   • Diarrhea   • Rectal Bleeding     Bright red blood (about a month ago)   • Abdominal Pain     Lower intestinal (7/8 on pain scale)    • med ck     HPI:  Patient is a very pleasant 72-year-old female with a family history of colon cancer. We saw her about 2 years ago for colonoscopy which was fairly unremarkable. She reports over the last few months she has had crampy abdominal pain and loose stools in the morning. Pain is always relieved with defecation she says she goes once or twice in the morning and then is okay for the rest of the day. No significant rectal bleeding no upper tract symptoms except acid reflux when she bends over after eating.         Historical Information   Past Medical History:   Diagnosis Date   • Anxiety    • Bulimia 2020    recurrent   • Depression    • Eating disorder    • GERD (gastroesophageal reflux disease)    • Glaucoma    • Migraines    • Papanicolaou smear for cervical cancer screening 2019   • Tremors of nervous system      Past Surgical History:   Procedure Laterality Date   • COLONOSCOPY     • MAMMO (HISTORICAL) Bilateral 01/03/2019    [facility] BI-RADS 2 Benign finding Scattered areas fibroglandular density; 25% to 50% glandular breast tissue   • SINUS SURGERY     • TONSILECTOMY AND ADNOIDECTOMY     • TONSILLECTOMY     • UPPER GASTROINTESTINAL ENDOSCOPY       Social History     Substance and Sexual Activity   Alcohol Use Not Currently     Social History     Substance and Sexual Activity   Drug Use Never     Social History     Tobacco Use   Smoking Status Never   Smokeless Tobacco Never     Family History   Problem Relation Age of Onset   • Hyperlipidemia Mother    • Lymphoma Father    • Hypertension Father    • Colon polyps Father    • Skin cancer Maternal Grandmother    • Alzheimer's disease Maternal Grandmother    • Hypertension Maternal Grandfather    • Heart attack Maternal Grandfather    • Colon cancer Paternal Grandmother    • Breast cancer Maternal Aunt 49   • Stroke Paternal Aunt    • Rectal cancer Brother 43   • Colon polyps Brother    • Uterine cancer Neg Hx    • Ovarian cancer Neg Hx          Current Outpatient Medications:   •  ibuprofen (MOTRIN) 200 mg tablet  •  Ibuprofen-Acetaminophen (ADVIL DUAL ACTION PO)  •  lamoTRIgine (LaMICtal) 200 MG tablet  •  latanoprost (XALATAN) 0.005 % ophthalmic solution  •  Levonorgestrel (Mirena, 52 MG,) 20 MCG/DAY IUD  •  lithium carbonate 150 mg capsule  •  pantoprazole (PROTONIX) 40 mg tablet  •  propranolol (INDERAL) 20 mg tablet  •  vilazodone (VIIBRYD) 40 mg tablet  •  neomycin-polymyxin-dexamethasone (MAXITROL) ophthalmic suspension  Allergies   Allergen Reactions   • Latex Itching and Rash     Reviewed medications and allergies and updated as indicated    PHYSICAL EXAM:    Height 5' 7.5" (1.715 m), weight 102 kg (224 lb 6.4 oz), not currently breastfeeding. Body mass index is 34.63 kg/m². General Appearance: NAD, cooperative, alert  Eyes: Anicteric, PERRLA, EOMI  ENT:  Normocephalic, atraumatic, normal mucosa. Neck:  Supple, symmetrical, trachea midline  Resp:  Clear to auscultation bilaterally; no rales, rhonchi or wheezing; respirations unlabored   CV:  S1 S2, Regular rate and rhythm; no murmur, rub, or gallop. GI:  Soft, non-tender, non-distended; normal bowel sounds; no masses, no organomegaly   Rectal: Deferred  Musculoskeletal: No cyanosis, clubbing or edema. Normal ROM. Skin:  No jaundice, rashes, or lesions   Heme/Lymph: No palpable cervical lymphadenopathy  Psych: Normal affect, good eye contact  Neuro: No gross deficits, AAOx3    Lab Results:   No results found for: "WBC", "HGB", "HCT", "MCV", "PLT"  No results found for: "NA", "K", "CL", "CO2", "ANIONGAP", "BUN", "CREATININE", "GLUCOSE", "GLUF", "CALCIUM", "CORRECTEDCA", "AST", "ALT", "ALKPHOS", "PROT", "BILITOT", "EGFR"  No results found for: "IRON", "TIBC", "FERRITIN"  No results found for: "LIPASE"    Radiology Results:   No results found. Answers for HPI/ROS submitted by the patient on 7/24/2023  Chronicity: chronic  Onset: more than 1 year ago  Onset quality: undetermined  Frequency: daily  Episode duration: 4 Hours  Progression since onset: waxing and waning  Pain location: LLQ, RLQ, generalized abdominal region  Pain - numeric: 7/10  Pain quality: aching, cramping, sharp, tearing  Radiates to: LLQ, RLQ, epigastric region, back  anorexia: No  arthralgias: No  belching: Yes  constipation: No  diarrhea: Yes  dysuria: No  fever: No  flatus: Yes  frequency: No  headaches: Yes  hematochezia: Yes  hematuria: No  melena: No  myalgias: Yes  nausea:  No  weight loss: No  vomiting: No  Aggravated by: bowel movement, certain positions, eating, movement  Relieved by: being still, bowel movements, certain positions, passing flatus, recumbency

## 2023-09-04 DIAGNOSIS — K58.0 IRRITABLE BOWEL SYNDROME WITH DIARRHEA: ICD-10-CM

## 2023-10-10 DIAGNOSIS — K58.0 IRRITABLE BOWEL SYNDROME WITH DIARRHEA: ICD-10-CM

## 2023-11-27 DIAGNOSIS — K58.0 IRRITABLE BOWEL SYNDROME WITH DIARRHEA: ICD-10-CM

## 2023-12-19 ENCOUNTER — OFFICE VISIT (OUTPATIENT)
Dept: URGENT CARE | Facility: CLINIC | Age: 43
End: 2023-12-19
Payer: COMMERCIAL

## 2023-12-19 VITALS
HEART RATE: 78 BPM | SYSTOLIC BLOOD PRESSURE: 132 MMHG | TEMPERATURE: 97.5 F | RESPIRATION RATE: 16 BRPM | OXYGEN SATURATION: 99 % | DIASTOLIC BLOOD PRESSURE: 75 MMHG

## 2023-12-19 DIAGNOSIS — U07.1 COVID-19: Primary | ICD-10-CM

## 2023-12-19 DIAGNOSIS — R68.83 CHILLS: ICD-10-CM

## 2023-12-19 LAB
SARS-COV-2 AG UPPER RESP QL IA: POSITIVE
VALID CONTROL: ABNORMAL

## 2023-12-19 PROCEDURE — 87811 SARS-COV-2 COVID19 W/OPTIC: CPT

## 2023-12-19 PROCEDURE — 99213 OFFICE O/P EST LOW 20 MIN: CPT

## 2023-12-19 RX ORDER — FLUTICASONE PROPIONATE 50 MCG
1 SPRAY, SUSPENSION (ML) NASAL DAILY
Qty: 11.1 G | Refills: 0 | Status: SHIPPED | OUTPATIENT
Start: 2023-12-19

## 2023-12-19 RX ORDER — BENZONATATE 200 MG/1
200 CAPSULE ORAL 3 TIMES DAILY PRN
Qty: 20 CAPSULE | Refills: 0 | Status: SHIPPED | OUTPATIENT
Start: 2023-12-19

## 2023-12-19 NOTE — LETTER
December 19, 2023     Patient: Safia Stovall   YOB: 1980   Date of Visit: 12/19/2023       To Whom It May Concern:    It is my medical opinion that Safia Stovall may return to work on 12/26/23 .    If you have any questions or concerns, please don't hesitate to call.         Sincerely,        EDELMIRA Teresa    CC: No Recipients

## 2023-12-20 NOTE — PATIENT INSTRUCTIONS
You tested positive in the office today.  You should isolate for the next 4 days and wear a mask for the next 9 days.    Rest and increase fluids  Tylenol or motrin for fever and pain  Take either mucinex or day quill may use Thera flu for congestion  Flonase in each nostril daily  Tessalon pearls every 8 hours for cough. If ineffective you can use delsym cough medicine  If your symptoms become worse and you develop SOB or CP go to the ED

## 2023-12-20 NOTE — PROGRESS NOTES
[facility]        NAME: Safia Stovall is a 43 y.o. female  : 1980    MRN: [id number]  DATE: 2023  TIME: 7:23 PM    Assessment and Plan   COVID-19 [U07.1]  1. COVID-19  fluticasone (FLONASE) 50 mcg/act nasal spray    benzonatate (TESSALON) 200 MG capsule      2. Chills  Poct Covid 19 Rapid Antigen Test            Patient Instructions   You tested positive for covid in the office today.  You should isolate for the next 4 days and wear a mask for the next 9 days.    Rest and increase fluids  Tylenol or motrin for fever and pain  Take either mucinex or day quill may use Thera flu for congestion  Flonase in each nostril daily  Tessalon pearls every 8 hours for cough. If ineffective you can use delsym cough medicine  If your symptoms become worse and you develop SOB or CP go to the EDollow up with PCP in 3-5 days.  Proceed to  ER if symptoms worsen.    Chief Complaint     Chief Complaint   Patient presents with    Cold Like Symptoms     Pt states she started with nausea for a few days, resolved, and now has chills, body aches, and cough.          History of Present Illness       This is a 43 year old female who presents with a positive covid test at home today.  She started with body aches and chills yesterday. A few days ago she had nausea. She denies nay fever. She has sore throat, nasal congestion, runny nose, headache, and cough. Her employer requested she be seen and have an official covid test.  She has been taking advil dual formula        Review of Systems   Review of Systems   Constitutional:  Positive for chills and fatigue. Negative for fever.   HENT:  Positive for congestion, postnasal drip, rhinorrhea, sinus pressure and sore throat. Negative for ear pain.    Eyes: Negative.    Respiratory:  Positive for cough. Negative for chest tightness and shortness of breath.    Cardiovascular:  Negative for chest pain.   Gastrointestinal:  Positive for nausea. Negative for abdominal  pain, diarrhea and vomiting.   Genitourinary: Negative.    Musculoskeletal:  Positive for myalgias.   Skin:  Negative for rash.   Neurological:  Positive for headaches.         Current Medications       Current Outpatient Medications:     benzonatate (TESSALON) 200 MG capsule, Take 1 capsule (200 mg total) by mouth 3 (three) times a day as needed for cough, Disp: 20 capsule, Rfl: 0    fluticasone (FLONASE) 50 mcg/act nasal spray, 1 spray into each nostril daily, Disp: 11.1 g, Rfl: 0    ibuprofen (MOTRIN) 200 mg tablet, Take 400 mg by mouth daily at bedtime, Disp: , Rfl:     lamoTRIgine (LaMICtal) 200 MG tablet, Take 400 mg by mouth daily, Disp: , Rfl:     latanoprost (XALATAN) 0.005 % ophthalmic solution, place 1 drop into both eyes at bedtime, Disp: , Rfl:     Levonorgestrel (Mirena, 52 MG,) 20 MCG/DAY IUD, 1 each by Intrauterine route once, Disp: , Rfl:     propranolol (INDERAL) 20 mg tablet, take 1 tablet by mouth once daily if needed for ACUTE ANXIETY, Disp: , Rfl:     vilazodone (VIIBRYD) 40 mg tablet, Take 40 mg by mouth daily with breakfast, Disp: , Rfl:     hyoscyamine (LEVSIN/SL) 0.125 mg SL tablet, dissolve 1 tablet under the tongue every 4 hours if needed for CRAMPING (Patient not taking: Reported on 12/19/2023), Disp: 60 tablet, Rfl: 2    Ibuprofen-Acetaminophen (ADVIL DUAL ACTION PO), Take by mouth (Patient not taking: Reported on 12/19/2023), Disp: , Rfl:     lithium carbonate 150 mg capsule, Take 150 mg by mouth 2 (two) times a day (Patient not taking: Reported on 12/19/2023), Disp: , Rfl:     neomycin-polymyxin-dexamethasone (MAXITROL) ophthalmic suspension, instill 1 drop into right eye three times a day for 1 week, Disp: , Rfl:     pantoprazole (PROTONIX) 40 mg tablet, Take 1 tablet (40 mg total) by mouth daily (Patient not taking: Reported on 12/19/2023), Disp: 30 tablet, Rfl: 0    Current Allergies     Allergies as of 12/19/2023 - Reviewed 12/19/2023   Allergen Reaction Noted    Latex Itching and  Rash 10/27/2021            The following portions of the patient's history were reviewed and updated as appropriate: allergies, current medications, past family history, past medical history, past social history, past surgical history and problem list.     Past Medical History:   Diagnosis Date    Anxiety     Bulimia 2020    recurrent    Depression     Eating disorder     GERD (gastroesophageal reflux disease)     Glaucoma     Migraines     Papanicolaou smear for cervical cancer screening 2019    Tremors of nervous system        Past Surgical History:   Procedure Laterality Date    COLONOSCOPY      MAMMO (HISTORICAL) Bilateral 01/03/2019    [facility] BI-RADS 2 Benign finding Scattered areas fibroglandular density; 25% to 50% glandular breast tissue    SINUS SURGERY      TONSILECTOMY AND ADNOIDECTOMY      TONSILLECTOMY      UPPER GASTROINTESTINAL ENDOSCOPY         Family History   Problem Relation Age of Onset    Hyperlipidemia Mother     Lymphoma Father     Hypertension Father     Colon polyps Father     Skin cancer Maternal Grandmother     Alzheimer's disease Maternal Grandmother     Hypertension Maternal Grandfather     Heart attack Maternal Grandfather     Colon cancer Paternal Grandmother     Breast cancer Maternal Aunt 49    Stroke Paternal Aunt     Rectal cancer Brother 42    Colon polyps Brother     Uterine cancer Neg Hx     Ovarian cancer Neg Hx          Medications have been verified.        Objective   /75   Pulse 78   Temp 97.5 °F (36.4 °C)   Resp 16   SpO2 99%   No LMP recorded. Patient has had an implant.       Physical Exam     Physical Exam  Constitutional:       Appearance: She is normal weight. She is ill-appearing.   HENT:      Head: Normocephalic and atraumatic.      Right Ear: Tympanic membrane, ear canal and external ear normal. There is no impacted cerumen.      Left Ear: Tympanic membrane, ear canal and external ear normal. There is no impacted cerumen.      Nose: Rhinorrhea present. No  congestion.      Mouth/Throat:      Mouth: Mucous membranes are moist.      Pharynx: Posterior oropharyngeal erythema present. No oropharyngeal exudate.   Eyes:      Conjunctiva/sclera: Conjunctivae normal.      Pupils: Pupils are equal, round, and reactive to light.   Cardiovascular:      Rate and Rhythm: Normal rate and regular rhythm.      Pulses: Normal pulses.      Heart sounds: Normal heart sounds.   Pulmonary:      Effort: Pulmonary effort is normal.      Breath sounds: Normal breath sounds. No wheezing or rhonchi.   Abdominal:      General: Abdomen is flat. Bowel sounds are normal.      Tenderness: There is no abdominal tenderness.   Musculoskeletal:         General: Normal range of motion.      Cervical back: Normal range of motion and neck supple.   Lymphadenopathy:      Cervical: No cervical adenopathy.   Skin:     General: Skin is warm and dry.      Capillary Refill: Capillary refill takes less than 2 seconds.   Neurological:      General: No focal deficit present.      Mental Status: She is alert and oriented to person, place, and time. Mental status is at baseline.   Psychiatric:         Mood and Affect: Mood normal.         Thought Content: Thought content normal.         Judgment: Judgment normal.

## 2024-01-10 DIAGNOSIS — K58.0 IRRITABLE BOWEL SYNDROME WITH DIARRHEA: ICD-10-CM

## 2024-02-16 ENCOUNTER — OFFICE VISIT (OUTPATIENT)
Dept: GASTROENTEROLOGY | Facility: CLINIC | Age: 44
End: 2024-02-16
Payer: COMMERCIAL

## 2024-02-16 VITALS
BODY MASS INDEX: 33.65 KG/M2 | HEIGHT: 68 IN | DIASTOLIC BLOOD PRESSURE: 80 MMHG | SYSTOLIC BLOOD PRESSURE: 130 MMHG | WEIGHT: 222 LBS

## 2024-02-16 DIAGNOSIS — K58.0 IRRITABLE BOWEL SYNDROME WITH DIARRHEA: Primary | ICD-10-CM

## 2024-02-16 DIAGNOSIS — K21.9 GASTROESOPHAGEAL REFLUX DISEASE WITHOUT ESOPHAGITIS: ICD-10-CM

## 2024-02-16 DIAGNOSIS — K64.8 INTERNAL HEMORRHOIDS: ICD-10-CM

## 2024-02-16 PROCEDURE — 99214 OFFICE O/P EST MOD 30 MIN: CPT | Performed by: INTERNAL MEDICINE

## 2024-02-16 NOTE — PROGRESS NOTES
[facility] - Outpatient Follow-up Note  Safia Stovall 44 y.o. female MRN: [id number]  Encounter: [id number]    ASSESSMENT AND PLAN:      1. Irritable bowel syndrome with diarrhea  Stable.    2. Gastroesophageal reflux disease without esophagitis  Stable    3. Internal hemorrhoids  I believe she is describing symptoms of internal hemorrhoids she had a high quality colonoscopy in 2021 which was negative.  We discussed high-fiber diet at length and gave her a copy of this.  If her symptoms persist neck step would be banding I told her she can just call to have a set that up.  I did go over the procedure with her in general terms      Followup Appointment: 6 months  ______________________________________________________________________    Chief Complaint   Patient presents with    Hemorrhoids     Pt states it was sore and she thought it went away but this morning it came back and she is sore, no blood     HPI: Patient is a very pleasant 44-year-old female we follow with a history of irritable bowel syndrome and acid reflux.  She reports over the last couple months she has been having intermittent problems with rectal discomfort she describes occasionally straining to move her bowels she feels a small nodule pop out and then popped back in it can be sore when it happens.  No pain with defecation no rectal bleeding no abdominal pain no weight loss no other GI symptoms.    Historical Information   Past Medical History:   Diagnosis Date    Anxiety     Bulimia 2020    recurrent    Depression     Eating disorder     GERD (gastroesophageal reflux disease)     Glaucoma     Migraines     Papanicolaou smear for cervical cancer screening 2019    Tremors of nervous system      Past Surgical History:   Procedure Laterality Date    COLONOSCOPY      MAMMO (HISTORICAL) Bilateral 01/03/2019    [facility] BI-RADS 2 Benign finding Scattered areas fibroglandular density; 25% to 50% glandular breast tissue     "SINUS SURGERY      TONSILECTOMY AND ADNOIDECTOMY      TONSILLECTOMY      UPPER GASTROINTESTINAL ENDOSCOPY       Social History     Substance and Sexual Activity   Alcohol Use Not Currently     Social History     Substance and Sexual Activity   Drug Use Never     Social History     Tobacco Use   Smoking Status Never   Smokeless Tobacco Never     Family History   Problem Relation Age of Onset    Hyperlipidemia Mother     Lymphoma Father     Hypertension Father     Colon polyps Father     Skin cancer Maternal Grandmother     Alzheimer's disease Maternal Grandmother     Hypertension Maternal Grandfather     Heart attack Maternal Grandfather     Colon cancer Paternal Grandmother     Breast cancer Maternal Aunt 49    Stroke Paternal Aunt     Rectal cancer Brother 42    Colon polyps Brother     Uterine cancer Neg Hx     Ovarian cancer Neg Hx          Current Outpatient Medications:     fluticasone (FLONASE) 50 mcg/act nasal spray    hyoscyamine (LEVSIN/SL) 0.125 mg SL tablet    ibuprofen (MOTRIN) 200 mg tablet    lamoTRIgine (LaMICtal) 200 MG tablet    latanoprost (XALATAN) 0.005 % ophthalmic solution    Levonorgestrel (Mirena, 52 MG,) 20 MCG/DAY IUD    propranolol (INDERAL) 20 mg tablet    vilazodone (VIIBRYD) 40 mg tablet    benzonatate (TESSALON) 200 MG capsule    Ibuprofen-Acetaminophen (ADVIL DUAL ACTION PO)    lithium carbonate 150 mg capsule    neomycin-polymyxin-dexamethasone (MAXITROL) ophthalmic suspension    pantoprazole (PROTONIX) 40 mg tablet  Allergies   Allergen Reactions    Latex Itching and Rash     Reviewed medications and allergies and updated as indicated    PHYSICAL EXAM:    Blood pressure 130/80, height 5' 8\" (1.727 m), weight 101 kg (222 lb), not currently breastfeeding. Body mass index is 33.75 kg/m².  General Appearance: NAD, cooperative, alert  Eyes: Anicteric, conjunctiva pink  ENT:  Normocephalic, atraumatic, normal mucosa.    Neck:  Supple, symmetrical, trachea midline  Resp:  Clear to " "auscultation bilaterally; no rales, rhonchi or wheezing; respirations unlabored   CV:  S1 S2, Regular rate and rhythm; no murmur, rub, or gallop.  GI:  Soft, non-tender, non-distended; normal bowel sounds; no masses, no organomegaly   Rectal: Normal no fissure no discomfort no blood.  Musculoskeletal: No cyanosis, clubbing or edema. Normal ROM.  Skin:  No jaundice, rashes, or lesions   Heme/Lymph: No palpable cervical lymphadenopathy  Psych: Normal affect, good eye contact  Neuro: No gross deficits, AAOx3    Lab Results:   No results found for: \"WBC\", \"HGB\", \"HCT\", \"MCV\", \"PLT\"  No results found for: \"NA\", \"K\", \"CL\", \"CO2\", \"ANIONGAP\", \"BUN\", \"CREATININE\", \"GLUCOSE\", \"GLUF\", \"CALCIUM\", \"CORRECTEDCA\", \"AST\", \"ALT\", \"ALKPHOS\", \"PROT\", \"BILITOT\", \"EGFR\"    Radiology Results:   No results found.    "

## 2024-03-08 ENCOUNTER — ANNUAL EXAM (OUTPATIENT)
Dept: OBGYN CLINIC | Facility: CLINIC | Age: 44
End: 2024-03-08
Payer: COMMERCIAL

## 2024-03-08 VITALS
SYSTOLIC BLOOD PRESSURE: 122 MMHG | DIASTOLIC BLOOD PRESSURE: 78 MMHG | HEIGHT: 68 IN | WEIGHT: 222 LBS | BODY MASS INDEX: 33.65 KG/M2

## 2024-03-08 DIAGNOSIS — Z12.31 ENCOUNTER FOR SCREENING MAMMOGRAM FOR MALIGNANT NEOPLASM OF BREAST: ICD-10-CM

## 2024-03-08 DIAGNOSIS — Z01.419 GYNECOLOGIC EXAM NORMAL: Primary | ICD-10-CM

## 2024-03-08 PROCEDURE — S0612 ANNUAL GYNECOLOGICAL EXAMINA: HCPCS | Performed by: PHYSICIAN ASSISTANT

## 2024-03-08 RX ORDER — LAMOTRIGINE 150 MG/1
300 TABLET ORAL DAILY
COMMUNITY
Start: 2024-02-20

## 2024-03-08 RX ORDER — LAMOTRIGINE 100 MG/1
TABLET ORAL
COMMUNITY
Start: 2024-03-06

## 2024-03-08 NOTE — PROGRESS NOTES
Assessment/Plan   Problem List Items Addressed This Visit          Other    Gynecologic exam normal - Primary     Pap guidelines reviewed. Pap with HPV done today.  Continue Mirena IUD.   Script for mammogram given.   Return to office for annual or as needed.          Relevant Orders    Thinprep Tis Pap and HPV mRNA E6/E7 Reflex HPV 16,18/45     Other Visit Diagnoses       Encounter for screening mammogram for malignant neoplasm of breast        Relevant Orders    Mammo screening bilateral w 3d & cad            Subjective:     Patient ID: Safia Stovall is a 44 y.o. y.o. female.    HPI  43 yo seen for annual exam. Has Mirena IUD inserted 12/29/2022. Reports doing well without issues. Occasional light spotting with minimal cramping.   Denies bowel or bladder issues.   Mother passed away in 9/2023, following with therapist.   Last pap: 6/5/2019 NILM (-)HRHPV.   Last mammogram: 5/25/2023 BIRADS 1: Negative.   The following portions of the patient's history were reviewed and updated as appropriate: She  has a past medical history of Anxiety, Bulimia (2020), Depression, Eating disorder, GERD (gastroesophageal reflux disease), Glaucoma, Migraines, Papanicolaou smear for cervical cancer screening (2019), and Tremors of nervous system.  She   Patient Active Problem List    Diagnosis Date Noted    Gynecologic exam normal 03/08/2024    COVID-19 12/19/2023    IUD check up 01/27/2023    Encounter for insertion of mirena IUD 12/29/2022    Contraceptive education 11/11/2022    Anorexia nervosa, binge-eating purging type 03/25/2022    Gastroesophageal reflux disease 12/17/2021    Glaucoma 12/17/2021     She  has a past surgical history that includes Tonsillectomy; Sinus surgery; Upper gastrointestinal endoscopy; Colonoscopy; TONSILECTOMY AND ADNOIDECTOMY; and Mammo (historical) (Bilateral, 01/03/2019).  Her family history includes Alzheimer's disease in her maternal grandmother; Breast cancer (age of onset: 49) in her maternal  aunt; Colon cancer in her paternal grandmother; Colon polyps in her brother and father; Heart attack in her maternal grandfather; Hyperlipidemia in her mother; Hypertension in her father and maternal grandfather; Lymphoma in her father; Rectal cancer (age of onset: 42) in her brother; Skin cancer in her maternal grandmother; Stroke in her paternal aunt.  She  reports that she has never smoked. She has never used smokeless tobacco. She reports that she does not currently use alcohol. She reports that she does not use drugs.  Current Outpatient Medications   Medication Sig Dispense Refill    ibuprofen (MOTRIN) 200 mg tablet Take 400 mg by mouth daily at bedtime      lamoTRIgine (LaMICtal) 100 mg tablet       lamoTRIgine (LaMICtal) 150 MG tablet Take 300 mg by mouth daily      lamoTRIgine (LaMICtal) 200 MG tablet Take 400 mg by mouth daily      latanoprost (XALATAN) 0.005 % ophthalmic solution place 1 drop into both eyes at bedtime      Levonorgestrel (Mirena, 52 MG,) 20 MCG/DAY IUD 1 each by Intrauterine route once      propranolol (INDERAL) 20 mg tablet take 1 tablet by mouth once daily if needed for ACUTE ANXIETY      vilazodone (VIIBRYD) 40 mg tablet Take 40 mg by mouth daily with breakfast       No current facility-administered medications for this visit.     She is allergic to latex..    Menstrual History:  OB History          0    Para   0    Term   0       0    AB   0    Living   0         SAB   0    IAB   0    Ectopic   0    Multiple   0    Live Births   0           Obstetric Comments   Menarche: 13                No LMP recorded. Patient has had an implant.         Review of Systems   Constitutional:  Negative for fatigue, fever and unexpected weight change.   HENT:  Negative for dental problem and sinus pressure.    Eyes:  Negative for visual disturbance.   Respiratory:  Negative for cough, shortness of breath and wheezing.    Cardiovascular:  Negative for chest pain.   Gastrointestinal:   "Negative for abdominal pain, blood in stool, constipation, diarrhea, nausea and vomiting.   Endocrine: Negative for polydipsia.   Genitourinary:  Negative for difficulty urinating, dyspareunia, dysuria, frequency, hematuria, pelvic pain and urgency.   Musculoskeletal:  Negative for arthralgias and back pain.   Neurological:  Negative for dizziness, seizures, light-headedness and headaches.   Psychiatric/Behavioral:  Negative for suicidal ideas. The patient is not nervous/anxious.        Objective:  Vitals:    03/08/24 1248   BP: 122/78   BP Location: Right arm   Patient Position: Sitting   Cuff Size: Standard   Weight: 101 kg (222 lb)   Height: 5' 8\" (1.727 m)      Physical Exam  Constitutional:       Appearance: Normal appearance. She is well-developed.   Genitourinary:      Vulva and bladder normal.      No lesions in the vagina.      Right Labia: No rash, tenderness, lesions or skin changes.     Left Labia: No tenderness, lesions, skin changes or rash.     No labial fusion noted.      No inguinal adenopathy present in the right or left side.     No vaginal discharge, erythema, tenderness or bleeding.        Right Adnexa: not tender, not full and no mass present.     Left Adnexa: not tender, not full and no mass present.     No cervical motion tenderness, discharge or lesion.      No IUD strings visualized (unable to see IUD strings but able to feel at cervical os on bimanual exam.).      Uterus is not enlarged, tender or irregular.      No uterine mass detected.     No urethral prolapse, tenderness or mass present.      Bladder is not tender.    Breasts:     Breasts are symmetrical.      Right: No swelling, bleeding, inverted nipple, mass, nipple discharge, skin change or tenderness.      Left: No swelling, bleeding, inverted nipple, mass, nipple discharge, skin change or tenderness.   HENT:      Head: Normocephalic and atraumatic.   Neck:      Thyroid: No thyromegaly.   Cardiovascular:      Rate and Rhythm: " Normal rate and regular rhythm.      Heart sounds: Normal heart sounds. No murmur heard.     No friction rub. No gallop.   Pulmonary:      Effort: Pulmonary effort is normal. No respiratory distress.      Breath sounds: Normal breath sounds. No wheezing.   Abdominal:      General: There is no distension.      Palpations: Abdomen is soft. There is no mass.      Tenderness: There is no abdominal tenderness. There is no guarding or rebound.      Hernia: No hernia is present.   Lymphadenopathy:      Cervical: No cervical adenopathy.      Upper Body:      Right upper body: No supraclavicular, axillary or pectoral adenopathy.      Left upper body: No supraclavicular, axillary or pectoral adenopathy.      Lower Body: No right inguinal adenopathy. No left inguinal adenopathy.   Neurological:      Mental Status: She is alert and oriented to person, place, and time.   Skin:     General: Skin is warm and dry.   Psychiatric:         Behavior: Behavior normal.

## 2024-03-08 NOTE — ASSESSMENT & PLAN NOTE
Pap guidelines reviewed. Pap with HPV done today.  Continue Mirena IUD.   Script for mammogram given.   Return to office for annual or as needed.

## 2024-03-12 LAB
CLINICAL INFO: NORMAL
CYTO CVX: NORMAL
CYTOLOGY CMNT CVX/VAG CYTO-IMP: NORMAL
DATE PREVIOUS BX: NORMAL
HPV E6+E7 MRNA CVX QL NAA+PROBE: NOT DETECTED
LMP START DATE: NORMAL
SL AMB PREV. PAP:: NORMAL
SPECIMEN SOURCE CVX/VAG CYTO: NORMAL

## 2024-05-01 PROBLEM — Z01.419 GYNECOLOGIC EXAM NORMAL: Status: RESOLVED | Noted: 2024-03-08 | Resolved: 2024-05-01

## 2024-08-06 ENCOUNTER — OFFICE VISIT (OUTPATIENT)
Dept: OBGYN CLINIC | Facility: CLINIC | Age: 44
End: 2024-08-06
Payer: COMMERCIAL

## 2024-08-06 VITALS
WEIGHT: 229.4 LBS | SYSTOLIC BLOOD PRESSURE: 120 MMHG | HEIGHT: 68 IN | BODY MASS INDEX: 34.77 KG/M2 | DIASTOLIC BLOOD PRESSURE: 78 MMHG

## 2024-08-06 DIAGNOSIS — N95.1 PERIMENOPAUSE: Primary | ICD-10-CM

## 2024-08-06 PROCEDURE — 99213 OFFICE O/P EST LOW 20 MIN: CPT | Performed by: PHYSICIAN ASSISTANT

## 2024-08-06 NOTE — ASSESSMENT & PLAN NOTE
Reviewed HRT with patient in length. No concerns with her birth control as on Mirena IUD. Reviewed possibility of decreasing effectiveness of Lamictal levels but otherwise no other known contraindications.  Given option of seeing Dr. Ella Malhotra if does not see results with this regimen from [place].

## 2024-08-06 NOTE — PROGRESS NOTES
Assessment & Plan   Problem List Items Addressed This Visit          Obstetrics/Gynecology    Perimenopause - Primary     Reviewed HRT with patient in length. No concerns with her birth control as on Mirena IUD. Reviewed possibility of decreasing effectiveness of Lamictal levels but otherwise no other known contraindications.  Given option of seeing Dr. Ella Malhotra if does not see results with this regimen from [facility].               Subjective:     Patient ID: Safia Stovall is a 44 y.o. y.o. female.    HPI  43 yo seen to discuss HRT.   Issues with weight gain. Tired all the time, insomnia, some hot flashes in am.   Went to [facility] and spoke with a doctor with them. Recommended started on DHEA 25mg, Progesterone 100mg daily, Estradiol 1mg, DHEA 25 mg daily. Patient wanted to get our opinion on it and be sure it isn't going to effect her birth control.   The following portions of the patient's history were reviewed and updated as appropriate: She  has a past medical history of Anxiety, Bulimia (2020), Depression, Eating disorder, GERD (gastroesophageal reflux disease), Glaucoma, Migraines, Papanicolaou smear for cervical cancer screening (2019), and Tremors of nervous system.  She   Patient Active Problem List    Diagnosis Date Noted    Perimenopause 08/06/2024    COVID-19 12/19/2023    IUD check up 01/27/2023    Encounter for insertion of mirena IUD 12/29/2022    Contraceptive education 11/11/2022    Anorexia nervosa, binge-eating purging type 03/25/2022    Gastroesophageal reflux disease 12/17/2021    Glaucoma 12/17/2021     She  has a past surgical history that includes Tonsillectomy; Sinus surgery; Upper gastrointestinal endoscopy; Colonoscopy; TONSILECTOMY AND ADNOIDECTOMY; and Mammo (historical) (Bilateral, 01/03/2019).  Her family history includes Alzheimer's disease in her maternal grandmother; Breast cancer (age of onset: 49) in her maternal aunt; Colon cancer in her paternal grandmother; Colon  polyps in her brother and father; Heart attack in her maternal grandfather; Hyperlipidemia in her mother; Hypertension in her father and maternal grandfather; Lymphoma in her father; Rectal cancer (age of onset: 42) in her brother; Skin cancer in her maternal grandmother; Stroke in her paternal aunt.  She  reports that she has never smoked. She has never used smokeless tobacco. She reports that she does not currently use alcohol. She reports that she does not use drugs.  Current Outpatient Medications   Medication Sig Dispense Refill    ibuprofen (MOTRIN) 200 mg tablet Take 400 mg by mouth daily at bedtime      lamoTRIgine (LaMICtal) 200 MG tablet Take 400 mg by mouth daily      latanoprost (XALATAN) 0.005 % ophthalmic solution place 1 drop into both eyes at bedtime      Levonorgestrel (Mirena, 52 MG,) 20 MCG/DAY IUD 1 each by Intrauterine route once      propranolol (INDERAL) 20 mg tablet take 1 tablet by mouth once daily if needed for ACUTE ANXIETY      vilazodone (VIIBRYD) 40 mg tablet Take 40 mg by mouth daily with breakfast      lamoTRIgine (LaMICtal) 100 mg tablet  (Patient not taking: Reported on 2024)      lamoTRIgine (LaMICtal) 150 MG tablet Take 300 mg by mouth daily (Patient not taking: Reported on 2024)       No current facility-administered medications for this visit.     She is allergic to latex..    Menstrual History:  OB History          0    Para   0    Term   0       0    AB   0    Living   0         SAB   0    IAB   0    Ectopic   0    Multiple   0    Live Births   0           Obstetric Comments   Menarche: 13                No LMP recorded. Patient has had an implant.         Review of Systems   Constitutional:  Negative for fatigue, fever and unexpected weight change.   HENT:  Negative for dental problem and sinus pressure.    Eyes:  Negative for visual disturbance.   Respiratory:  Negative for cough, shortness of breath and wheezing.    Cardiovascular:  Negative for chest  "pain.   Gastrointestinal:  Negative for abdominal pain, blood in stool, constipation, diarrhea, nausea and vomiting.   Endocrine: Negative for polydipsia.   Genitourinary:  Negative for difficulty urinating, dyspareunia, dysuria, frequency, hematuria, pelvic pain and urgency.   Musculoskeletal:  Negative for arthralgias and back pain.   Neurological:  Negative for dizziness, seizures, light-headedness and headaches.   Psychiatric/Behavioral:  Negative for suicidal ideas. The patient is not nervous/anxious.        Objective:  Vitals:    08/06/24 1447   BP: 120/78   BP Location: Left arm   Patient Position: Sitting   Cuff Size: Standard   Weight: 104 kg (229 lb 6.4 oz)   Height: 5' 8\" (1.727 m)      Physical Exam  Constitutional:       Appearance: She is well-developed.   HENT:      Head: Normocephalic and atraumatic.   Neck:      Thyroid: No thyromegaly.   Cardiovascular:      Rate and Rhythm: Normal rate and regular rhythm.      Heart sounds: Normal heart sounds. No murmur heard.     No friction rub. No gallop.   Pulmonary:      Effort: Pulmonary effort is normal. No respiratory distress.      Breath sounds: Normal breath sounds. No wheezing.   Abdominal:      General: There is no distension.      Palpations: Abdomen is soft. There is no mass.      Tenderness: There is no abdominal tenderness. There is no guarding or rebound.      Hernia: No hernia is present.   Lymphadenopathy:      Cervical: No cervical adenopathy.   Neurological:      Mental Status: She is alert and oriented to person, place, and time.   Skin:     General: Skin is warm and dry.   Psychiatric:         Behavior: Behavior normal.         "

## 2024-09-26 ENCOUNTER — HOSPITAL ENCOUNTER (OUTPATIENT)
Dept: MAMMOGRAPHY | Facility: CLINIC | Age: 44
Discharge: HOME | End: 2024-09-26
Payer: COMMERCIAL

## 2024-09-26 VITALS — WEIGHT: 229 LBS | HEIGHT: 68 IN | BODY MASS INDEX: 34.71 KG/M2

## 2024-09-26 DIAGNOSIS — Z12.31 ENCOUNTER FOR SCREENING MAMMOGRAM FOR MALIGNANT NEOPLASM OF BREAST: ICD-10-CM

## 2024-09-26 PROCEDURE — 77067 SCR MAMMO BI INCL CAD: CPT

## 2024-09-26 PROCEDURE — 77063 BREAST TOMOSYNTHESIS BI: CPT

## 2025-03-26 ENCOUNTER — TRANSCRIBE ORDERS (OUTPATIENT)
Dept: SCHEDULING | Age: 45
End: 2025-03-26

## 2025-03-26 DIAGNOSIS — R06.09 OTHER FORMS OF DYSPNEA: Primary | ICD-10-CM

## 2025-03-31 ENCOUNTER — TELEPHONE (OUTPATIENT)
Age: 45
End: 2025-03-31

## 2025-03-31 NOTE — TELEPHONE ENCOUNTER
Maria E from [facility] called to request a copy of pt's last PAP and MAMMO be faxed to [phone].

## 2025-04-04 ENCOUNTER — PATIENT OUTREACH (OUTPATIENT)
Dept: OBGYN CLINIC | Facility: CLINIC | Age: 45
End: 2025-04-04

## 2025-04-04 DIAGNOSIS — R45.851 SUICIDAL THOUGHTS: ICD-10-CM

## 2025-04-04 DIAGNOSIS — F32.2 SEVERE DEPRESSION (HCC): Primary | ICD-10-CM

## 2025-04-04 NOTE — PROGRESS NOTES
Patient's annual exam was cancelled today secondary to provider being out sick. Patient filled out Questionnaire with PHQ9 on 4/1/2025.   Indicated severe depression with score of 22. For thoughts to harm self or better off dead selected nearly every day.   Staff called patient who reported following with a therapist.   Patient is currently on Lamictal and Viibryd per patient chart.   Referral for social work sent to patient and detailed message sent to  to call patient to evaluate current safety.

## 2025-04-04 NOTE — PROGRESS NOTES
"SW referred for crisis evaluation. SW received message from [employer] - patient scored 22 on depression screening. Indicated \"nearly every day\" to the question \"thoughts that you would be better off dead, or of hurting yourself in some way\". Patient was contacted by WILLY Carter prior to  outreach - patient stated she sees a therapist weekly and a psychiatrist.    SW called patient to complete assessment. Patient reports she has depression and anxiety at baseline - therapist also diagnosed her with PTSD. Patient states she has been to [facility] four times in her life, most recently [place] in 2002.     When asked about SI/HI, patient stated \"this is just something I've dealt with the past 26 years\". Patient reported \"I have passive thoughts\". When asked if she has a plan or intent, patient adamantly denied. Patient reports her therapist, psychiatrist, and  are aware of these thoughts and she is safe.     Patient aware to call crisis in case of active SI/HI. Reports she already has access to crisis information. She denies having any weapons/firearms at home. Her  is supportive. She regularly attends therapy weekly (Sharda Hull). Sees psychiatrist every 3 months (Kisha Cui). Takes Vybrid, Lamictal & Propanalol (essential tremors).Works out every day as self-care. Denies interest in support groups - has tried before and did not find it helpful.    Has been under added stress the past few years - lost her mother, her father, her grandfather, and her two cats all in two years. 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 Does not plan to return - in school for masters degree and in process of training for medical coding. Denies additional SDOH (food, housing, utilities, transportation) stressors.     Patient denies additional needs at this time - SW contact info provided if she is interested in additional support later on. SW closing referral, please re-refer as " needed.

## 2025-05-01 ENCOUNTER — ANNUAL EXAM (OUTPATIENT)
Dept: OBGYN CLINIC | Facility: CLINIC | Age: 45
End: 2025-05-01
Payer: COMMERCIAL

## 2025-05-01 VITALS
DIASTOLIC BLOOD PRESSURE: 82 MMHG | WEIGHT: 217.8 LBS | HEIGHT: 68 IN | BODY MASS INDEX: 33.01 KG/M2 | SYSTOLIC BLOOD PRESSURE: 118 MMHG

## 2025-05-01 DIAGNOSIS — Z32.02 URINE PREGNANCY TEST NEGATIVE: ICD-10-CM

## 2025-05-01 DIAGNOSIS — T83.32XD INTRAUTERINE CONTRACEPTIVE DEVICE THREADS LOST, SUBSEQUENT ENCOUNTER: ICD-10-CM

## 2025-05-01 DIAGNOSIS — Z12.31 ENCOUNTER FOR SCREENING MAMMOGRAM FOR BREAST CANCER: ICD-10-CM

## 2025-05-01 DIAGNOSIS — Z97.5 IUD (INTRAUTERINE DEVICE) IN PLACE: ICD-10-CM

## 2025-05-01 DIAGNOSIS — Z01.419 GYNECOLOGIC EXAM NORMAL: Primary | ICD-10-CM

## 2025-05-01 DIAGNOSIS — N92.6 IRREGULAR BLEEDING: ICD-10-CM

## 2025-05-01 PROBLEM — H40.9 GLAUCOMA: Status: RESOLVED | Noted: 2021-12-17 | Resolved: 2025-05-01

## 2025-05-01 PROBLEM — I77.810 DILATED AORTIC ROOT (HCC): Status: ACTIVE | Noted: 2025-05-01

## 2025-05-01 PROBLEM — G43.109 BASILAR MIGRAINE: Status: ACTIVE | Noted: 2023-11-01

## 2025-05-01 LAB — SL AMB POCT URINE HCG: NEGATIVE

## 2025-05-01 PROCEDURE — S0612 ANNUAL GYNECOLOGICAL EXAMINA: HCPCS | Performed by: PHYSICIAN ASSISTANT

## 2025-05-01 PROCEDURE — 81025 URINE PREGNANCY TEST: CPT | Performed by: PHYSICIAN ASSISTANT

## 2025-05-01 RX ORDER — RIMEGEPANT SULFATE 75 MG/75MG
75 TABLET, ORALLY DISINTEGRATING ORAL
COMMUNITY

## 2025-05-01 NOTE — PROGRESS NOTES
[facility] [address], [address], [place] [address]    ASSESSMENT/PLAN: Safia Stovall is a 45 y.o.  who presents for annual gynecologic exam.    Encounter for routine gynecologic examination  - Routine well woman exam completed today.  - Cervical Cancer Screening: Current ASCCP Guidelines reviewed. Last Pap: 2024 NILM (-)HRHPV. History of abnormal: no.  - STI screening offered including HIV testing: offered, pt declined  - Contraceptive counseling discussed.  Current contraception: IUD, Mirena IUD inserted 2022:   - Breast Cancer Screening: Last Mammogram 2024, BIRADS 1: Negative.   - Colorectal cancer screening was not ordered. 2021 normal. Due .       Additional problems addressed during this visit:  1. Gynecologic exam normal  Assessment & Plan:  Pap guidelines reviewed. Pap deferred secondary to negative pap and HPV in  in this low risk patient.   Continue yearly mammograms. Up to date with colon cancer screening.   Unable to see IUD strings on exam. Will plan ultrasound to further evaluate especially with breakthrough bleeding last month. UPT negative in office.   Return to office for annual or as needed.   2. Encounter for screening mammogram for breast cancer  -     Mammo screening bilateral w 3d and cad; Future  3. IUD (intrauterine device) in place  -     US pelvis complete w transvaginal; Future; Expected date: 2025  4. Intrauterine contraceptive device threads lost, subsequent encounter  -     US pelvis complete w transvaginal; Future; Expected date: 2025  5. Irregular bleeding  -     US pelvis complete w transvaginal; Future; Expected date: 2025  6. Urine pregnancy test negative  -     POCT urine HCG      CC:  Annual Gynecologic Examination    HPI: Safia Stovall is a 45 y.o.  who presents for annual gynecologic examination. Patient has Mirena IUD inserted 2022. Typically does not get menses. Reports last  month got menses for 5 days was light bleeding.   Denies bowel or bladder issues.     ROS: Negative except as noted in HPI    No LMP recorded. Patient has had an implant.       She  reports being sexually active and has had partner(s) who are male. She reports using the following method of birth control/protection: I.U.D..       The following portions of the patient's history were reviewed and updated as appropriate:   Past Medical History:   Diagnosis Date    Anxiety     Basilar migraine     Bulimia     recurrent    Depression     Eating disorder     GERD (gastroesophageal reflux disease)     Glaucoma     Migraines     Papanicolaou smear for cervical cancer screening     Tremors of nervous system      Past Surgical History:   Procedure Laterality Date    COLONOSCOPY      MAMMO (HISTORICAL) Bilateral 2019    [facility] BI-RADS 2 Benign finding Scattered areas fibroglandular density; 25% to 50% glandular breast tissue    SINUS SURGERY      TONSILECTOMY AND ADNOIDECTOMY      TONSILLECTOMY      UPPER GASTROINTESTINAL ENDOSCOPY       Family History   Problem Relation Age of Onset    Hyperlipidemia Mother     Cancer Mother         Pancreatic Cancer    Deep vein thrombosis Mother         DVT    Lymphoma Father     Hypertension Father     Colon polyps Father     Skin cancer Maternal Grandmother     Alzheimer's disease Maternal Grandmother     Hypertension Maternal Grandfather     Heart attack Maternal Grandfather     Heart disease Maternal Grandfather     Colon cancer Paternal Grandmother     Transient ischemic attack Paternal Grandmother     Breast cancer Maternal Aunt 49    Stroke Paternal Aunt     Rectal cancer Brother 42    Colon polyps Brother     Heart disease Paternal Grandfather          from heart attack before I was born    Hyperlipidemia Brother     Uterine cancer Neg Hx     Ovarian cancer Neg Hx      Social History     Socioeconomic History    Marital status: /Civil Union     Spouse name: None  "   Number of children: None    Years of education: None    Highest education level: None   Occupational History    None   Tobacco Use    Smoking status: Never    Smokeless tobacco: Never   Vaping Use    Vaping status: Never Used   Substance and Sexual Activity    Alcohol use: Not Currently    Drug use: Never    Sexual activity: Yes     Partners: Male     Birth control/protection: I.U.D.     Comment: no new partner in past year   Other Topics Concern    None   Social History Narrative    None     Social Drivers of Health     Financial Resource Strain: Not on file   Food Insecurity: Not on file   Transportation Needs: Not on file   Physical Activity: Not on file   Stress: Not on file   Social Connections: Not on file   Intimate Partner Violence: Not on file   Housing Stability: Not on file     Outpatient Medications Marked as Taking for the 5/1/25 encounter (Annual Exam) with Paula Velez PA-C   Medication    Erenumab-aooe (AIMOVIG, 140 MG DOSE, SC)    ibuprofen (MOTRIN) 200 mg tablet    lamoTRIgine (LaMICtal) 200 MG tablet    latanoprost (XALATAN) 0.005 % ophthalmic solution    Levonorgestrel (Mirena, 52 MG,) 20 MCG/DAY IUD    Nurtec 75 MG TBDP    propranolol (INDERAL) 20 mg tablet    vilazodone (VIIBRYD) 40 mg tablet     Allergies   Allergen Reactions    Latex Itching and Rash           Objective:  /82 (BP Location: Left arm, Patient Position: Sitting, Cuff Size: Standard)   Ht 5' 8\" (1.727 m)   Wt 98.8 kg (217 lb 12.8 oz)   BMI 33.12 kg/m²        Chaperone present? Yes: Kim CHRIS Student.    Physical Exam  Constitutional:       Appearance: Normal appearance. She is well-developed.   Genitourinary:      Vulva and bladder normal.      No lesions in the vagina.      Right Labia: No rash, tenderness, lesions or skin changes.     Left Labia: No tenderness, lesions, skin changes or rash.     No labial fusion noted.      No inguinal adenopathy present in the right or left side.     No vaginal " discharge, erythema, tenderness or bleeding.        Right Adnexa: not tender, not full and no mass present.     Left Adnexa: not tender, not full and no mass present.     No cervical motion tenderness, discharge or lesion.      No IUD strings visualized.      Uterus is not enlarged, tender or irregular.      No uterine mass detected.     No urethral prolapse, tenderness or mass present.      Bladder is not tender.    Breasts:     Breasts are symmetrical.      Right: No swelling, bleeding, inverted nipple, mass, nipple discharge, skin change or tenderness.      Left: No swelling, bleeding, inverted nipple, mass, nipple discharge, skin change or tenderness.   HENT:      Head: Normocephalic and atraumatic.   Neck:      Thyroid: No thyromegaly.   Cardiovascular:      Rate and Rhythm: Normal rate and regular rhythm.      Heart sounds: Normal heart sounds. No murmur heard.     No friction rub. No gallop.   Pulmonary:      Effort: Pulmonary effort is normal. No respiratory distress.      Breath sounds: Normal breath sounds. No wheezing.   Abdominal:      General: There is no distension.      Palpations: Abdomen is soft. There is no mass.      Tenderness: There is no abdominal tenderness. There is no guarding or rebound.      Hernia: No hernia is present.   Lymphadenopathy:      Cervical: No cervical adenopathy.      Upper Body:      Right upper body: No supraclavicular, axillary or pectoral adenopathy.      Left upper body: No supraclavicular, axillary or pectoral adenopathy.      Lower Body: No right inguinal adenopathy. No left inguinal adenopathy.   Neurological:      Mental Status: She is alert and oriented to person, place, and time.   Skin:     General: Skin is warm and dry.   Psychiatric:         Behavior: Behavior normal.             Paula Velez PA-C  5/1/2025 9:20 PM

## 2025-05-02 NOTE — ASSESSMENT & PLAN NOTE
Pap guidelines reviewed. Pap deferred secondary to negative pap and HPV in 2024 in this low risk patient.   Continue yearly mammograms. Up to date with colon cancer screening.   Unable to see IUD strings on exam. Will plan ultrasound to further evaluate especially with breakthrough bleeding last month. UPT negative in office.   Return to office for annual or as needed.

## 2025-05-05 ENCOUNTER — HOSPITAL ENCOUNTER (OUTPATIENT)
Dept: ULTRASOUND IMAGING | Facility: HOSPITAL | Age: 45
Discharge: HOME | End: 2025-05-05
Attending: PHYSICIAN ASSISTANT
Payer: COMMERCIAL

## 2025-05-05 DIAGNOSIS — T83.32XD INTRAUTERINE CONTRACEPTIVE DEVICE THREADS LOST, SUBSEQUENT ENCOUNTER: ICD-10-CM

## 2025-05-05 DIAGNOSIS — N92.6 IRREGULAR BLEEDING: ICD-10-CM

## 2025-05-05 DIAGNOSIS — Z97.5 IUD (INTRAUTERINE DEVICE) IN PLACE: ICD-10-CM

## 2025-05-05 PROCEDURE — 76856 US EXAM PELVIC COMPLETE: CPT

## 2025-05-05 PROCEDURE — 76830 TRANSVAGINAL US NON-OB: CPT

## 2025-05-09 ENCOUNTER — RESULTS FOLLOW-UP (OUTPATIENT)
Dept: OBGYN CLINIC | Facility: CLINIC | Age: 45
End: 2025-05-09

## 2025-05-31 PROBLEM — Z01.419 GYNECOLOGIC EXAM NORMAL: Status: RESOLVED | Noted: 2025-05-01 | Resolved: 2025-05-31
